# Patient Record
Sex: FEMALE | Race: WHITE | Employment: STUDENT | URBAN - METROPOLITAN AREA
[De-identification: names, ages, dates, MRNs, and addresses within clinical notes are randomized per-mention and may not be internally consistent; named-entity substitution may affect disease eponyms.]

---

## 2023-02-19 ENCOUNTER — HOSPITAL ENCOUNTER (INPATIENT)
Age: 23
LOS: 5 days | Discharge: HOME | DRG: 263 | End: 2023-02-24
Attending: SURGERY | Admitting: SURGERY
Payer: COMMERCIAL

## 2023-02-19 ENCOUNTER — APPOINTMENT (OUTPATIENT)
Dept: CT IMAGING | Age: 23
DRG: 263 | End: 2023-02-19
Payer: COMMERCIAL

## 2023-02-19 DIAGNOSIS — K80.50 CHOLEDOCHOLITHIASIS: Primary | ICD-10-CM

## 2023-02-19 DIAGNOSIS — R74.01 TRANSAMINITIS: ICD-10-CM

## 2023-02-19 DIAGNOSIS — T83.9XXA COMPLICATION OF INTRAUTERINE DEVICE (IUD), UNSPECIFIED COMPLICATION, INITIAL ENCOUNTER (HCC): ICD-10-CM

## 2023-02-19 DIAGNOSIS — K81.0 ACUTE CHOLECYSTITIS: ICD-10-CM

## 2023-02-19 LAB
A/G RATIO: 1.6 (ref 1.1–2.2)
ALBUMIN SERPL-MCNC: 4.4 G/DL (ref 3.4–5)
ALP BLD-CCNC: 140 U/L (ref 40–129)
ALT SERPL-CCNC: 335 U/L (ref 10–40)
ANION GAP SERPL CALCULATED.3IONS-SCNC: 12 MMOL/L (ref 3–16)
AST SERPL-CCNC: 178 U/L (ref 15–37)
BACTERIA: ABNORMAL /HPF
BASOPHILS ABSOLUTE: 0.1 K/UL (ref 0–0.2)
BASOPHILS RELATIVE PERCENT: 0.7 %
BILIRUB SERPL-MCNC: 3 MG/DL (ref 0–1)
BILIRUBIN URINE: ABNORMAL
BLOOD, URINE: ABNORMAL
BUN BLDV-MCNC: 8 MG/DL (ref 7–20)
CALCIUM SERPL-MCNC: 9.4 MG/DL (ref 8.3–10.6)
CHLORIDE BLD-SCNC: 99 MMOL/L (ref 99–110)
CLARITY: ABNORMAL
CO2: 22 MMOL/L (ref 21–32)
COLOR: ABNORMAL
CREAT SERPL-MCNC: <0.5 MG/DL (ref 0.6–1.1)
EOSINOPHILS ABSOLUTE: 0.1 K/UL (ref 0–0.6)
EOSINOPHILS RELATIVE PERCENT: 1.1 %
EPITHELIAL CELLS, UA: 14 /HPF (ref 0–5)
GFR SERPL CREATININE-BSD FRML MDRD: >60 ML/MIN/{1.73_M2}
GLUCOSE BLD-MCNC: 107 MG/DL (ref 70–99)
GLUCOSE URINE: NEGATIVE MG/DL
HCG QUALITATIVE: NEGATIVE
HCT VFR BLD CALC: 38.2 % (ref 36–48)
HEMOGLOBIN: 13.3 G/DL (ref 12–16)
HYALINE CASTS: 1 /LPF (ref 0–8)
KETONES, URINE: ABNORMAL MG/DL
LEUKOCYTE ESTERASE, URINE: ABNORMAL
LIPASE: 27 U/L (ref 13–60)
LYMPHOCYTES ABSOLUTE: 1.7 K/UL (ref 1–5.1)
LYMPHOCYTES RELATIVE PERCENT: 18.3 %
MCH RBC QN AUTO: 29.6 PG (ref 26–34)
MCHC RBC AUTO-ENTMCNC: 35 G/DL (ref 31–36)
MCV RBC AUTO: 84.6 FL (ref 80–100)
MICROSCOPIC EXAMINATION: YES
MONOCYTES ABSOLUTE: 0.6 K/UL (ref 0–1.3)
MONOCYTES RELATIVE PERCENT: 6.7 %
NEUTROPHILS ABSOLUTE: 6.8 K/UL (ref 1.7–7.7)
NEUTROPHILS RELATIVE PERCENT: 73.2 %
NITRITE, URINE: POSITIVE
PDW BLD-RTO: 13.8 % (ref 12.4–15.4)
PH UA: 7 (ref 5–8)
PLATELET # BLD: 317 K/UL (ref 135–450)
PMV BLD AUTO: 8.7 FL (ref 5–10.5)
POTASSIUM REFLEX MAGNESIUM: 3.9 MMOL/L (ref 3.5–5.1)
PROTEIN UA: ABNORMAL MG/DL
RBC # BLD: 4.51 M/UL (ref 4–5.2)
RBC UA: 5 /HPF (ref 0–4)
SODIUM BLD-SCNC: 133 MMOL/L (ref 136–145)
SPECIFIC GRAVITY UA: 1.02 (ref 1–1.03)
TOTAL PROTEIN: 7.2 G/DL (ref 6.4–8.2)
URINE REFLEX TO CULTURE: YES
URINE TYPE: ABNORMAL
UROBILINOGEN, URINE: 1 E.U./DL
WBC # BLD: 9.2 K/UL (ref 4–11)
WBC UA: 10 /HPF (ref 0–5)

## 2023-02-19 PROCEDURE — 2580000003 HC RX 258: Performed by: SURGERY

## 2023-02-19 PROCEDURE — 6370000000 HC RX 637 (ALT 250 FOR IP): Performed by: PHYSICIAN ASSISTANT

## 2023-02-19 PROCEDURE — 99285 EMERGENCY DEPT VISIT HI MDM: CPT

## 2023-02-19 PROCEDURE — 2500000003 HC RX 250 WO HCPCS: Performed by: SURGERY

## 2023-02-19 PROCEDURE — 1200000000 HC SEMI PRIVATE

## 2023-02-19 PROCEDURE — 81001 URINALYSIS AUTO W/SCOPE: CPT

## 2023-02-19 PROCEDURE — 87186 SC STD MICRODIL/AGAR DIL: CPT

## 2023-02-19 PROCEDURE — 87077 CULTURE AEROBIC IDENTIFY: CPT

## 2023-02-19 PROCEDURE — 80053 COMPREHEN METABOLIC PANEL: CPT

## 2023-02-19 PROCEDURE — 6360000002 HC RX W HCPCS: Performed by: PHYSICIAN ASSISTANT

## 2023-02-19 PROCEDURE — 36415 COLL VENOUS BLD VENIPUNCTURE: CPT

## 2023-02-19 PROCEDURE — 6360000004 HC RX CONTRAST MEDICATION: Performed by: PHYSICIAN ASSISTANT

## 2023-02-19 PROCEDURE — 6360000002 HC RX W HCPCS: Performed by: SURGERY

## 2023-02-19 PROCEDURE — 99222 1ST HOSP IP/OBS MODERATE 55: CPT | Performed by: SURGERY

## 2023-02-19 PROCEDURE — 84703 CHORIONIC GONADOTROPIN ASSAY: CPT

## 2023-02-19 PROCEDURE — 74177 CT ABD & PELVIS W/CONTRAST: CPT

## 2023-02-19 PROCEDURE — C9113 INJ PANTOPRAZOLE SODIUM, VIA: HCPCS | Performed by: SURGERY

## 2023-02-19 PROCEDURE — 2580000003 HC RX 258: Performed by: PHYSICIAN ASSISTANT

## 2023-02-19 PROCEDURE — 83690 ASSAY OF LIPASE: CPT

## 2023-02-19 PROCEDURE — 6360000002 HC RX W HCPCS

## 2023-02-19 PROCEDURE — 87086 URINE CULTURE/COLONY COUNT: CPT

## 2023-02-19 PROCEDURE — 85025 COMPLETE CBC W/AUTO DIFF WBC: CPT

## 2023-02-19 RX ORDER — SODIUM CHLORIDE 0.9 % (FLUSH) 0.9 %
5-40 SYRINGE (ML) INJECTION EVERY 12 HOURS SCHEDULED
Status: DISCONTINUED | OUTPATIENT
Start: 2023-02-19 | End: 2023-02-24 | Stop reason: HOSPADM

## 2023-02-19 RX ORDER — METRONIDAZOLE 500 MG/100ML
500 INJECTION, SOLUTION INTRAVENOUS EVERY 8 HOURS
Status: DISCONTINUED | OUTPATIENT
Start: 2023-02-19 | End: 2023-02-24 | Stop reason: HOSPADM

## 2023-02-19 RX ORDER — POTASSIUM CHLORIDE 7.45 MG/ML
10 INJECTION INTRAVENOUS PRN
Status: DISCONTINUED | OUTPATIENT
Start: 2023-02-19 | End: 2023-02-24 | Stop reason: HOSPADM

## 2023-02-19 RX ORDER — ONDANSETRON 4 MG/1
4 TABLET, ORALLY DISINTEGRATING ORAL ONCE
Status: COMPLETED | OUTPATIENT
Start: 2023-02-19 | End: 2023-02-19

## 2023-02-19 RX ORDER — ENOXAPARIN SODIUM 100 MG/ML
40 INJECTION SUBCUTANEOUS DAILY
Status: DISCONTINUED | OUTPATIENT
Start: 2023-02-20 | End: 2023-02-24 | Stop reason: HOSPADM

## 2023-02-19 RX ORDER — OXYCODONE HYDROCHLORIDE 5 MG/1
5 TABLET ORAL EVERY 4 HOURS PRN
Status: DISCONTINUED | OUTPATIENT
Start: 2023-02-19 | End: 2023-02-24 | Stop reason: HOSPADM

## 2023-02-19 RX ORDER — OXYCODONE HYDROCHLORIDE 10 MG/1
10 TABLET ORAL EVERY 4 HOURS PRN
Status: DISCONTINUED | OUTPATIENT
Start: 2023-02-19 | End: 2023-02-24 | Stop reason: HOSPADM

## 2023-02-19 RX ORDER — KETOROLAC TROMETHAMINE 30 MG/ML
15 INJECTION, SOLUTION INTRAMUSCULAR; INTRAVENOUS ONCE
Status: COMPLETED | OUTPATIENT
Start: 2023-02-19 | End: 2023-02-19

## 2023-02-19 RX ORDER — SODIUM CHLORIDE, SODIUM LACTATE, POTASSIUM CHLORIDE, CALCIUM CHLORIDE 600; 310; 30; 20 MG/100ML; MG/100ML; MG/100ML; MG/100ML
INJECTION, SOLUTION INTRAVENOUS CONTINUOUS
Status: DISCONTINUED | OUTPATIENT
Start: 2023-02-19 | End: 2023-02-24 | Stop reason: HOSPADM

## 2023-02-19 RX ORDER — SODIUM CHLORIDE 0.9 % (FLUSH) 0.9 %
5-40 SYRINGE (ML) INJECTION PRN
Status: DISCONTINUED | OUTPATIENT
Start: 2023-02-19 | End: 2023-02-24 | Stop reason: HOSPADM

## 2023-02-19 RX ORDER — LEVOFLOXACIN 5 MG/ML
500 INJECTION, SOLUTION INTRAVENOUS EVERY 24 HOURS
Status: DISCONTINUED | OUTPATIENT
Start: 2023-02-19 | End: 2023-02-24 | Stop reason: HOSPADM

## 2023-02-19 RX ORDER — PROCHLORPERAZINE EDISYLATE 5 MG/ML
10 INJECTION INTRAMUSCULAR; INTRAVENOUS EVERY 6 HOURS PRN
Status: DISCONTINUED | OUTPATIENT
Start: 2023-02-19 | End: 2023-02-24 | Stop reason: HOSPADM

## 2023-02-19 RX ORDER — KETOROLAC TROMETHAMINE 30 MG/ML
15 INJECTION, SOLUTION INTRAMUSCULAR; INTRAVENOUS ONCE
Status: DISCONTINUED | OUTPATIENT
Start: 2023-02-19 | End: 2023-02-19

## 2023-02-19 RX ORDER — ONDANSETRON 4 MG/1
4 TABLET, ORALLY DISINTEGRATING ORAL EVERY 8 HOURS PRN
Status: DISCONTINUED | OUTPATIENT
Start: 2023-02-19 | End: 2023-02-24 | Stop reason: HOSPADM

## 2023-02-19 RX ORDER — ACETAMINOPHEN 325 MG/1
650 TABLET ORAL EVERY 4 HOURS PRN
Status: DISCONTINUED | OUTPATIENT
Start: 2023-02-19 | End: 2023-02-24 | Stop reason: HOSPADM

## 2023-02-19 RX ORDER — MAGNESIUM SULFATE IN WATER 40 MG/ML
2000 INJECTION, SOLUTION INTRAVENOUS PRN
Status: DISCONTINUED | OUTPATIENT
Start: 2023-02-19 | End: 2023-02-24 | Stop reason: HOSPADM

## 2023-02-19 RX ORDER — ONDANSETRON 2 MG/ML
4 INJECTION INTRAMUSCULAR; INTRAVENOUS EVERY 6 HOURS PRN
Status: DISCONTINUED | OUTPATIENT
Start: 2023-02-19 | End: 2023-02-24 | Stop reason: HOSPADM

## 2023-02-19 RX ORDER — SODIUM CHLORIDE 9 MG/ML
INJECTION, SOLUTION INTRAVENOUS PRN
Status: DISCONTINUED | OUTPATIENT
Start: 2023-02-19 | End: 2023-02-24 | Stop reason: HOSPADM

## 2023-02-19 RX ADMIN — Medication 25 MG: at 22:53

## 2023-02-19 RX ADMIN — ONDANSETRON 4 MG: 4 TABLET, ORALLY DISINTEGRATING ORAL at 14:54

## 2023-02-19 RX ADMIN — ONDANSETRON 4 MG: 2 INJECTION INTRAMUSCULAR; INTRAVENOUS at 21:25

## 2023-02-19 RX ADMIN — KETOROLAC TROMETHAMINE 15 MG: 30 INJECTION, SOLUTION INTRAMUSCULAR; INTRAVENOUS at 14:53

## 2023-02-19 RX ADMIN — PIPERACILLIN AND TAZOBACTAM 4500 MG: 4; .5 INJECTION, POWDER, LYOPHILIZED, FOR SOLUTION INTRAVENOUS at 16:40

## 2023-02-19 RX ADMIN — SODIUM CHLORIDE, POTASSIUM CHLORIDE, SODIUM LACTATE AND CALCIUM CHLORIDE: 600; 310; 30; 20 INJECTION, SOLUTION INTRAVENOUS at 22:22

## 2023-02-19 RX ADMIN — SODIUM CHLORIDE, PRESERVATIVE FREE 40 MG: 5 INJECTION INTRAVENOUS at 18:46

## 2023-02-19 RX ADMIN — METRONIDAZOLE 500 MG: 500 INJECTION, SOLUTION INTRAVENOUS at 18:52

## 2023-02-19 RX ADMIN — IOPAMIDOL 75 ML: 755 INJECTION, SOLUTION INTRAVENOUS at 14:46

## 2023-02-19 RX ADMIN — LEVOFLOXACIN 500 MG: 5 INJECTION, SOLUTION INTRAVENOUS at 21:29

## 2023-02-19 ASSESSMENT — PAIN SCALES - GENERAL
PAINLEVEL_OUTOF10: 2
PAINLEVEL_OUTOF10: 5
PAINLEVEL_OUTOF10: 6
PAINLEVEL_OUTOF10: 2
PAINLEVEL_OUTOF10: 0
PAINLEVEL_OUTOF10: 0

## 2023-02-19 ASSESSMENT — PAIN - FUNCTIONAL ASSESSMENT: PAIN_FUNCTIONAL_ASSESSMENT: 0-10

## 2023-02-19 ASSESSMENT — PAIN DESCRIPTION - ORIENTATION
ORIENTATION: MID
ORIENTATION: MID

## 2023-02-19 ASSESSMENT — ENCOUNTER SYMPTOMS
COLOR CHANGE: 0
ABDOMINAL PAIN: 1
BLOOD IN STOOL: 0
VOMITING: 1
SHORTNESS OF BREATH: 0
NAUSEA: 1
DIARRHEA: 0
COUGH: 0

## 2023-02-19 ASSESSMENT — PAIN DESCRIPTION - DESCRIPTORS
DESCRIPTORS: PRESSURE
DESCRIPTORS: PRESSURE

## 2023-02-19 ASSESSMENT — PAIN DESCRIPTION - LOCATION
LOCATION: ABDOMEN
LOCATION: ABDOMEN

## 2023-02-19 NOTE — PROGRESS NOTES
02/19/23 1838   Encounter Summary   Encounter Overview/Reason  Spiritual/Emotional Needs; Rituals, Rites and Sacraments   Service Provided For: Patient   Referral/Consult From: Nurse   Support System Parent   Last Encounter    (Support - request for prayer, anointing on 2/20 SM)   Complexity of Encounter Moderate   Begin Time 1820   End Time  1839   Total Time Calculated 19 min   Spiritual/Emotional needs   Type Spiritual Support   Rituals, Rites and Sacraments   Type   (request for Sacrament of the Sick, prayer)   Assessment/Intervention/Outcome   Assessment Coping   Intervention Discussed illness injury and its impact   Outcome Comfort;Expressed feelings, needs, and concerns   Plan and Referrals   Plan/Referrals Placed on [facility] List  (Referred to Fr. Ruano for visit on 2/20) Patient reportedly hard of hearing./impaired

## 2023-02-19 NOTE — H&P
General Surgery History & Physical      Georgia Snell   : 2000 MRN: [id number]  Date of Admission: 2023  Admitting Nhi Caldwell MD  Primary Care Physician: Jaden Strange MD    Chief Complaint   Patient presents with    Abdominal Pain     Worsen the past 2-3 days, c/o n/v       Chief Complaint/Reason for consultation: \"abdominal pain\"    Diagnosis Present on Admission:   Choledocholithiasis      Secondary Diagnosis  Patient Active Problem List   Diagnosis    Choledocholithiasis       History of Present Illness  Georgia Snell is a 25 y.o. female with negative PMHx who presents with a 3 day history of abdominal pain. Her pain is epigastric and RUQ, progressively worsening.  + nausea, vomiting and worsening symptoms after eating. She has had dark colored urine and pale stool. No fevers or chills. Approximately 3 weeks ago she had a similar episode of pain that occurred overnight and resolved without intervention. History reviewed. No pertinent past medical history. History reviewed. No pertinent surgical history. Family history reviewed and otherwise negative. History reviewed. No pertinent family history.   Social History     Socioeconomic History    Marital status: Single     Spouse name: Not on file    Number of children: Not on file    Years of education: Not on file    Highest education level: Not on file   Occupational History    Not on file   Tobacco Use    Smoking status: Not on file    Smokeless tobacco: Not on file   Substance and Sexual Activity    Alcohol use: Not on file    Drug use: Not on file    Sexual activity: Not on file   Other Topics Concern    Not on file   Social History Narrative    Not on file     Social Determinants of Health     Financial Resource Strain: Not on file   Food Insecurity: Not on file   Transportation Needs: Not on file   Physical Activity: Not on file   Stress: Not on file   Social Connections: Not on file   Intimate Partner Violence: Not on file   Housing Stability: Not on file     No Known Allergies  Prior to Admission medications    Not on File       Review of Systems  Pertinent positives and negatives are in HPI, otherwise all systems reviewed and negative    Physical Exam  Vitals:    02/19/23 1742   BP: 123/73   Pulse: 54   Resp: 16   Temp: 98 °F (36.7 °C)   SpO2: 99%       General/Appearance: NAD, alert and oriented  HEENT: PERRLA, Conjunctiva non injected, no scleral icterus. Mucous membranes pink and moist.  Neck is symmetrical. Trachea appears midline. Lung: normal respiratory effort, no accessory effort  Cardiac: Regular rate and rhythm  Abdomen: soft, ND, moderate epigastric and RUQ tenderness, no peritonitis  Neuro: No gross motor or sensory deficits. Skin: No open wounds or rashes. Labs  Recent Labs     02/19/23  1353   WBC 9.2   HGB 13.3   HCT 38.2        Recent Labs     02/19/23  1353   *   K 3.9   CL 99   CO2 22   BUN 8     Recent Labs     02/19/23  1353   *   *     No results for input(s): UOSM in the last 72 hours. Invalid input(s): [facility]APR, [facility], [facility], [place], [facility], [facility], [facility]    Imaging  CT ABDOMEN PELVIS W IV CONTRAST Additional Contrast? None   Preliminary Result   Intrahepatic and extrahepatic biliary ductal dilatation with underlying   cholelithiasis. Correlation with liver function test is recommended, which   will dictate the need for additional imaging such as MRI/MRCP or endoscopic   examination. T-shaped contraceptive device appears to be abnormally located and the left   arm appears to be overlying the superior wall of the uterine   myometrium/serosa, which may reflect partial perforation. Recommend   gynecological consultation. Apparent diffuse urinary bladder wall thickening, which may be due to under   distention. Correlate clinically with signs of cystitis.           CT abd/pelvis personally reviewed, showing distended gallbladder and biliary system    Assessment  Apolinar Wolf is a 25 y.o. female who presents with suspected choledocholithiasis, hyperbilirubinemia    Plan    1. Hyperbilirubinemia with suspected choledocholithiasis  Cholelithiasis and dilated bile duct system on CT imaging  T bili 3 and elevated LFTs  Levaquin, flagyl  OK for clears, NPO after midnight  Will consult GI in the morning for ERCP. Will plan to perform cholecystectomy the day afterwards.   2. TRISTA Lara MD

## 2023-02-19 NOTE — ED PROVIDER NOTES
[address]        Pt Name: Zac Roche  MRN: [id number]  [place] 2000  Date of evaluation: 2/19/2023  Provider: WILMER Swenson  PCP: Citlalli Rodriguez MD  Note Started: 6:05 PM EST 2/19/23       I have seen and evaluated this patient with my supervising physician Dr Ray Zaragoza. CHIEF COMPLAINT       Chief Complaint   Patient presents with    Abdominal Pain     Worsen the past 2-3 days, c/o n/v       HISTORY OF PRESENT ILLNESS: 1 or more Elements     History From: Patient  Limitations to history : None    Zac Roche is a 25 y.o. female who presents to the emergency department today with complaints of abdominal pain, nauseousness, vomiting. Patient states that this has been going on for a little while, but the last 2 to 3 days has become much worse. She states the pain is a burning type sensation located in the upper part of her abdomen. The symptoms are made worse whenever she eats. She states she has a very diminished appetite, because anytime she eats she just feels worse. She reports that her stools not outright diarrhea, but is a bit softer than normal and light brown in color. She also reports that her urine has been very dark in appearance in the last several days as well. She does not have any fevers, chest pain, shortness of breath. She denies previous abdominal surgeries. She denies any family history of gallbladder disease. She states she occasionally drinks alcohol, smokes marijuana every other day, but denies other drug use. She has no further complaints. Nursing Notes were all reviewed and agreed with or any disagreements were addressed in the HPI. REVIEW OF SYSTEMS :      Review of Systems   Constitutional:  Negative for chills and fever. Respiratory:  Negative for cough and shortness of breath. Cardiovascular:  Negative for chest pain and palpitations.    Gastrointestinal:  Positive for abdominal pain, nausea and vomiting. Negative for blood in stool and diarrhea.   Genitourinary:  Negative for dysuria, pelvic pain and urgency.   Skin:  Negative for color change and rash.   Psychiatric/Behavioral:  Negative for agitation and confusion.      Positives and Pertinent negatives as per HPI.     SURGICAL HISTORY   History reviewed. No pertinent surgical history.    CURRENTMEDICATIONS     There are no discharge medications for this patient.      ALLERGIES     Patient has no known allergies.    FAMILYHISTORY     History reviewed. No pertinent family history.     SOCIAL HISTORY       Social History     Tobacco Use    Smoking status: Never    Smokeless tobacco: Never   Substance Use Topics    Alcohol use: Not Currently    Drug use: Yes     Frequency: 3.0 times per week     Types: Marijuana (Weed)     Comment: 3 times per week       SCREENINGS        [place] Coma Scale  Eye Opening: Spontaneous  Best Verbal Response: Oriented  Best Motor Response: Obeys commands  [place] Coma Scale Score: 15                CIWA Assessment  BP: 115/68  Heart Rate: 50           PHYSICAL EXAM  1 or more Elements     ED Triage Vitals [02/19/23 1339]   BP Temp Temp Source Heart Rate Resp SpO2 Height Weight   121/76 98 °F (36.7 °C) Oral 62 17 99 % 5' 10\" (1.778 m) 167 lb 15.9 oz (76.2 kg)       Physical Exam  Vitals and nursing note reviewed.   Constitutional:       General: She is not in acute distress.     Appearance: She is well-developed. She is not ill-appearing, toxic-appearing or diaphoretic.   HENT:      Head: Normocephalic and atraumatic.   Eyes:      Conjunctiva/sclera: Conjunctivae normal.      Pupils: Pupils are equal, round, and reactive to light.   Cardiovascular:      Rate and Rhythm: Normal rate and regular rhythm.   Pulmonary:      Effort: Pulmonary effort is normal. No respiratory distress.   Abdominal:      General: Abdomen is flat. Bowel sounds are normal.      Palpations: Abdomen is soft.      Tenderness: There is abdominal tenderness in  the right upper quadrant and epigastric area. There is no guarding or rebound. Skin:     General: Skin is warm and dry. Neurological:      Mental Status: She is alert. Psychiatric:         Behavior: Behavior normal. Behavior is cooperative. DIAGNOSTIC RESULTS   LABS:    Labs Reviewed   COMPREHENSIVE METABOLIC PANEL W/ REFLEX TO MG FOR LOW K - Abnormal; Notable for the following components:       Result Value    Sodium 133 (*)     Glucose 107 (*)     Creatinine <0.5 (*)     Total Bilirubin 3.0 (*)     Alkaline Phosphatase 140 (*)      (*)      (*)     All other components within normal limits   URINALYSIS WITH REFLEX TO CULTURE - Abnormal; Notable for the following components:    Color, UA DARK YELLOW (*)     Clarity, UA CLOUDY (*)     Bilirubin Urine LARGE (*)     Ketones, Urine TRACE (*)     Blood, Urine TRACE (*)     Protein, UA TRACE (*)     Nitrite, Urine POSITIVE (*)     Leukocyte Esterase, Urine SMALL (*)     All other components within normal limits   MICROSCOPIC URINALYSIS - Abnormal; Notable for the following components:    Bacteria, UA 4+ (*)     WBC, UA 10 (*)     RBC, UA 5 (*)     Epithelial Cells, UA 14 (*)     All other components within normal limits   CULTURE, URINE   HCG, SERUM, QUALITATIVE   CBC WITH AUTO DIFFERENTIAL   LIPASE   BASIC METABOLIC PANEL W/ REFLEX TO MG FOR LOW K   MAGNESIUM   PHOSPHORUS   CBC WITH AUTO DIFFERENTIAL       When ordered only abnormal lab results are displayed. All other labs were within normal range or not returned as of this dictation. EKG: When ordered, EKG's are interpreted by the Emergency Department Physician in the absence of a cardiologist.  Please see their note for interpretation of EKG.     RADIOLOGY:   Non-plain film images such as CT, Ultrasound and MRI are read by the radiologist. Plain radiographic images are visualized and preliminarily interpreted by the ED Provider with the below findings:    Interpretation per the Radiologist below, if available at the time of this note:    CT ABDOMEN PELVIS W IV CONTRAST Additional Contrast? None   Preliminary Result   Intrahepatic and extrahepatic biliary ductal dilatation with underlying   cholelithiasis. Correlation with liver function test is recommended, which   will dictate the need for additional imaging such as MRI/MRCP or endoscopic   examination. T-shaped contraceptive device appears to be abnormally located and the left   arm appears to be overlying the superior wall of the uterine   myometrium/serosa, which may reflect partial perforation. Recommend   gynecological consultation. Apparent diffuse urinary bladder wall thickening, which may be due to under   distention. Correlate clinically with signs of cystitis. CT ABDOMEN PELVIS W IV CONTRAST Additional Contrast? None    Result Date: 2/19/2023  EXAMINATION: CT OF THE ABDOMEN AND PELVIS WITH CONTRAST 2/19/2023 2:45 pm TECHNIQUE: CT of the abdomen and pelvis was performed with the administration of intravenous contrast. Multiplanar reformatted images are provided for review. Automated exposure control, iterative reconstruction, and/or weight based adjustment of the mA/kV was utilized to reduce the radiation dose to as low as reasonably achievable. COMPARISON: None. HISTORY: ORDERING SYSTEM PROVIDED HISTORY: Upper epigastric and RUQ abdominal pain, N/V TECHNOLOGIST PROVIDED HISTORY: Reason for exam:->Upper epigastric and RUQ abdominal pain, N/V Additional Contrast?->None Decision Support Exception - unselect if not a suspected or confirmed emergency medical condition->Emergency Medical Condition (MA) FINDINGS: Lower Chest: No confluent airspace opacity or pleural effusion seen at the lung bases. Organs: There is prominence of intrahepatic and extrahepatic bile ducts. The extrahepatic common bile duct measures up to 11 mm.   There are punctate foci of gas and calcific density seen within fundus of the gallbladder reflecting underlying gallstones.  No evidence of pancreatic ductal dilatation seen. The spleen is not enlarged.  The adrenal glands appear within normal limits. The kidneys appear to enhance symmetrically.  No evidence of nephrolithiasis or hydronephrosis seen.  The ureters are not well visualized due to relative lack of intraabdominal intrapelvic fat. GI/Bowel: Evaluation of the GI tract is limited due to under distension.  The appendix appears nondilated.  No evidence of bowel obstruction seen. Pelvis: The urinary bladder is under distended.  Apparent diffuse urinary bladder wall thickening may be due to under distension.  There is a T-shaped contraceptive device.  The left arm of the device appears to overlie the superior wall of the uterine myometrium/serosa.  This may reflect partial perforation. Peritoneum/Retroperitoneum: No evidence of intraabdominal free air is seen. No evidence of ascites is seen.  The abdominal aorta is normal in caliber. Bones/Soft Tissues: No evidence of acute osseous abnormality seen.     Intrahepatic and extrahepatic biliary ductal dilatation with underlying cholelithiasis.  Correlation with liver function test is recommended, which will dictate the need for additional imaging such as MRI/MRCP or endoscopic examination. T-shaped contraceptive device appears to be abnormally located and the left arm appears to be overlying the superior wall of the uterine myometrium/serosa, which may reflect partial perforation.  Recommend gynecological consultation. Apparent diffuse urinary bladder wall thickening, which may be due to under distention.  Correlate clinically with signs of cystitis.       No results found.    PROCEDURES   Unless otherwise noted below, none     Procedures    PAST MEDICAL HISTORY      has no past medical history on file.     EMERGENCY DEPARTMENT COURSE and DIFFERENTIAL DIAGNOSIS/MDM:   Vitals:    Vitals:    02/19/23 1615 02/19/23 1700 02/19/23 1742 02/19/23  1851   BP: 115/71 116/81 123/73 115/68   Pulse: 64 64 54 50   Resp: 18 16 16 17   Temp:   98 °F (36.7 °C) 98.5 °F (36.9 °C)   TempSrc:   Oral Oral   SpO2: 100% 100% 99% 99%   Weight:       Height:           Patient was given the following medications:  Medications   sodium chloride flush 0.9 % injection 5-40 mL (has no administration in time range)   sodium chloride flush 0.9 % injection 5-40 mL (has no administration in time range)   0.9 % sodium chloride infusion (has no administration in time range)   ondansetron (ZOFRAN-ODT) disintegrating tablet 4 mg (has no administration in time range)     Or   ondansetron (ZOFRAN) injection 4 mg (has no administration in time range)   enoxaparin (LOVENOX) injection 40 mg (has no administration in time range)   lactated ringers IV soln infusion (has no administration in time range)   potassium chloride 10 mEq/100 mL IVPB (Peripheral Line) (has no administration in time range)   magnesium sulfate 2000 mg in 50 mL IVPB premix (has no administration in time range)   metronidazole (FLAGYL) 500 mg in 0.9% NaCl 100 mL IVPB premix (500 mg IntraVENous New Bag 2/19/23 1852)   levoFLOXacin (LEVAQUIN) 500 MG/100ML infusion 500 mg (has no administration in time range)   acetaminophen (TYLENOL) tablet 650 mg (has no administration in time range)   oxyCODONE (ROXICODONE) immediate release tablet 5 mg (has no administration in time range)     Or   oxyCODONE HCl (OXY-IR) immediate release tablet 10 mg (has no administration in time range)   HYDROmorphone (DILAUDID) injection 0.5 mg (has no administration in time range)     Or   HYDROmorphone (DILAUDID) injection 1 mg (has no administration in time range)   pantoprazole (PROTONIX) 40 mg in sodium chloride (PF) 0.9 % 10 mL injection (40 mg IntraVENous Given 2/19/23 1846)   ondansetron (ZOFRAN-ODT) disintegrating tablet 4 mg (4 mg Oral Given 2/19/23 1454)   iopamidol (ISOVUE-370) 76 % injection 75 mL (75 mLs IntraVENous Given 2/19/23 0288) ketorolac (TORADOL) injection 15 mg (15 mg IntraVENous Given 2/19/23 4697)   piperacillin-tazobactam (ZOSYN) 4,500 mg in sodium chloride 0.9 % 100 mL IVPB (mini-bag) (0 mg IntraVENous Stopped 2/19/23 9976)             Is this patient to be included in the SEP-1 Core Measure due to severe sepsis or septic shock? No   Exclusion criteria - the patient is NOT to be included for SEP-1 Core Measure due to: Infection is not suspected    Chronic Conditions affecting care:    has no past medical history on file. CONSULTS: (Who and What was discussed)  IP CONSULT TO GENERAL SURGERY  IP CONSULT TO SPIRITUAL SERVICES      Social Determinants Significantly Affecting Health : None    Records Reviewed (External and Source) EMR    CC/HPI Summary, DDx, ED Course, and Reassessment: This is a 25year old female with complaints of abdominal pain, nauseousness, vomiting. Patient states that this has been going on for a little while, but the last 2 to 3 days has become much worse. She states the pain is a burning type sensation located in the upper part of her abdomen. The symptoms are made worse whenever she eats. She states she has a very diminished appetite, because anytime she eats she just feels worse. She reports that her stools not outright diarrhea, but is a bit softer than normal and light brown in color. She also reports that her urine has been very dark in appearance in the last several days as well. She does not have any fevers, chest pain, shortness of breath. She denies previous abdominal surgeries. She denies any family history of gallbladder disease. She states she occasionally drinks alcohol, smokes marijuana every other day, but denies other drug use. - Vital signs were reviewed. Exam as above. Peripheral IV placed. Labs, Imaging ordered. - Pertinent Labs & Imaging studies reviewed. (See chart for details)   -  Patient seen and evaluated in the emergency department.   -  Triage and nursing notes reviewed and incorporated. -  Old chart records reviewed and incorporated. -  Code status: FULL  -   I have seen and evaluated this patient with my supervising physician Dr Andry Stack. -  Differential diagnosis includes: kidney stone, pyelonephritis, UTI, appendicitis, bowel obstruction, diverticulitis, hernia, gastritis/gastroenteritis, pancreatitis, cholecystitis, hepatitis, constipation, IBS, IBD  -  Work-up included:  See above  -  ED treatment included: Zofran 4 mg ODT, Toradol 15 mg IV, Zosyn 4500 mg IV  - Consults: General Surgery - I spoke with Dr Silvestre Kirk who reviewed the CT images, discussed the case with me, recommended admission to his service. He was agreeable to the Zosyn, and will discuss getting GI on board tomorrow as an inpatient.  -  Results discussed with patient and/or family. Labs show no leukocytosis or concerning anemia. Metabolic panel with bilirubin of 3.0, alk phos 140, , . Lipase is not elevated at 27. She is not pregnant. Urine with leukocytes, nitrites, 4+ bacteria, 10 white blood cells, 14 epithelial cells. It is possible this is contaminated, she does not have any UTI symptoms at this time. Imaging studies show intrahepatic and extrahepatic biliary ductal dilatation with underlying cholelithiasis, and given her correlated epigastric tenderness, elevated liver enzymes, patient likely will need endoscopic evaluation or  possible MRI MRCP. Also incidentally noted is that her IUD appears to be abnormally located, and may be partially perforated in the uterine wall with recommendations for gynecological consultation. She does not have any pain in her lower abdomen at this time, the IUD was apparently placed in October. Will advise her of this abnormal finding and recommend that she follow-up with her gynecologist.  Patient feels much improved after the Toradol, Zofran.   At this time, we recommend admission, as the patient would benefit from admission for further evaluation and management. The patient and/or family is agreeable with plan of care and disposition.  -  Disposition: Admission  - Critical Care: The total critical care time I independently spent while evaluating and treating this patient was 12 minutes. This excludes time spent doing separately billable procedures. This includes time at the bedside, data interpretation, medication management, obtaining critical history from collateral sources if the patient is unable to provide it directly, and physician consultation. Specifics of interventions taken and potentially life-threatening diagnostic considerations are listed above in the medical decision making. If this was a shared visit with an physician, the time in this attestation is non-concurrent critical care time out of the total shared critical care time provided by the physician and myself. FINAL IMPRESSION      1. Choledocholithiasis    2. Transaminitis    3. Complication of intrauterine device (IUD), unspecified complication, initial encounter [facility])          DISPOSITION/PLAN     DISPOSITION Admitted 02/19/2023 04:44:29 PM      PATIENT REFERRED TO:  No follow-up provider specified. DISCHARGE MEDICATIONS:  There are no discharge medications for this patient. DISCONTINUED MEDICATIONS:  There are no discharge medications for this patient.              (Please note that portions of this note were completed with a voice recognition program.  Efforts were made to edit the dictations but occasionally words are mis-transcribed.)    WILMER Govea (electronically signed)       Lucius Govea  02/19/23 2012

## 2023-02-19 NOTE — ED PROVIDER NOTES
Attending Supervisory Note/Shared Visit   I have personally performed a face to face diagnostic evaluation on this patient. I have reviewed the mid-levels findings and agree. History and Exam by me shows female no acute distress. 3-day history of increasing right upper quadrant abdominal pain, nausea, and vomiting. Radiates to her right back/shoulder area. No fever. General: Alert white female in no acute distress. She appears uncomfortable. Heart: Regular rate and rhythm. No murmurs or gallops noted. Lungs: Breath sounds equal bilaterally and clear. Abdomen: Soft, nondistended, right upper quadrant right subcostal tenderness with guarding but no rebound. No masses organomegaly. Bowel sounds are normal.    Labs Reviewed   COMPREHENSIVE METABOLIC PANEL W/ REFLEX TO MG FOR LOW K - Abnormal; Notable for the following components:       Result Value    Sodium 133 (*)     Glucose 107 (*)     Creatinine <0.5 (*)     Total Bilirubin 3.0 (*)     Alkaline Phosphatase 140 (*)      (*)      (*)     All other components within normal limits   URINALYSIS WITH REFLEX TO CULTURE - Abnormal; Notable for the following components:    Color, UA DARK YELLOW (*)     Clarity, UA CLOUDY (*)     Bilirubin Urine LARGE (*)     Ketones, Urine TRACE (*)     Blood, Urine TRACE (*)     Protein, UA TRACE (*)     Nitrite, Urine POSITIVE (*)     Leukocyte Esterase, Urine SMALL (*)     All other components within normal limits   MICROSCOPIC URINALYSIS - Abnormal; Notable for the following components:    Bacteria, UA 4+ (*)     WBC, UA 10 (*)     RBC, UA 5 (*)     Epithelial Cells, UA 14 (*)     All other components within normal limits   CULTURE, URINE   HCG, SERUM, QUALITATIVE   CBC WITH AUTO DIFFERENTIAL   LIPASE     CT ABDOMEN PELVIS W IV CONTRAST Additional Contrast? None   Preliminary Result   Intrahepatic and extrahepatic biliary ductal dilatation with underlying   cholelithiasis.   Correlation with liver function test is recommended, which   will dictate the need for additional imaging such as MRI/MRCP or endoscopic   examination. T-shaped contraceptive device appears to be abnormally located and the left   arm appears to be overlying the superior wall of the uterine   myometrium/serosa, which may reflect partial perforation. Recommend   gynecological consultation. Apparent diffuse urinary bladder wall thickening, which may be due to under   distention. Correlate clinically with signs of cystitis. General surgery was consulted. Dr. Merline Doyne will admit. IV antibiotics were initiated. Patient was medicated for pain and nausea. Test results, diagnosis, and treatment plan were discussed with the patient. She understands treatment plan and need for admission is agreeable      1. Choledocholithiasis    2. Transaminitis      Disposition:  Admit    Is this patient to be included in the SEP-1 Core Measure due to severe sepsis or septic shock?    No   Exclusion criteria - the patient is NOT to be included for SEP-1 Core Measure due to:  2+ SIRS criteria are not met    (Please note that portions of this note were completed with a voice recognition program.  Efforts were made to edit the dictations but occasionally words are mis-transcribed.)    Erika Landin MD  Attending Emergency Physician        Crissy Wiley MD  02/19/23 2738

## 2023-02-20 ENCOUNTER — APPOINTMENT (OUTPATIENT)
Dept: GENERAL RADIOLOGY | Age: 23
DRG: 263 | End: 2023-02-20
Payer: COMMERCIAL

## 2023-02-20 ENCOUNTER — ANESTHESIA EVENT (OUTPATIENT)
Dept: ENDOSCOPY | Age: 23
End: 2023-02-20
Payer: COMMERCIAL

## 2023-02-20 ENCOUNTER — ANESTHESIA EVENT (OUTPATIENT)
Dept: OPERATING ROOM | Age: 23
End: 2023-02-20
Payer: COMMERCIAL

## 2023-02-20 ENCOUNTER — ANESTHESIA (OUTPATIENT)
Dept: OPERATING ROOM | Age: 23
End: 2023-02-20
Payer: COMMERCIAL

## 2023-02-20 LAB
ALBUMIN SERPL-MCNC: 3.9 G/DL (ref 3.4–5)
ALP BLD-CCNC: 126 U/L (ref 40–129)
ALT SERPL-CCNC: 261 U/L (ref 10–40)
ANION GAP SERPL CALCULATED.3IONS-SCNC: 10 MMOL/L (ref 3–16)
AST SERPL-CCNC: 111 U/L (ref 15–37)
BASOPHILS ABSOLUTE: 0 K/UL (ref 0–0.2)
BASOPHILS RELATIVE PERCENT: 0.6 %
BILIRUB SERPL-MCNC: 3.6 MG/DL (ref 0–1)
BILIRUBIN DIRECT: 2.5 MG/DL (ref 0–0.3)
BILIRUBIN, INDIRECT: 1.1 MG/DL (ref 0–1)
BUN BLDV-MCNC: 6 MG/DL (ref 7–20)
CALCIUM SERPL-MCNC: 9.4 MG/DL (ref 8.3–10.6)
CHLORIDE BLD-SCNC: 105 MMOL/L (ref 99–110)
CO2: 25 MMOL/L (ref 21–32)
CREAT SERPL-MCNC: 0.7 MG/DL (ref 0.6–1.1)
EOSINOPHILS ABSOLUTE: 0.1 K/UL (ref 0–0.6)
EOSINOPHILS RELATIVE PERCENT: 1.3 %
GFR SERPL CREATININE-BSD FRML MDRD: >60 ML/MIN/{1.73_M2}
GLUCOSE BLD-MCNC: 102 MG/DL (ref 70–99)
HCT VFR BLD CALC: 36.3 % (ref 36–48)
HEMOGLOBIN: 12.8 G/DL (ref 12–16)
LYMPHOCYTES ABSOLUTE: 2 K/UL (ref 1–5.1)
LYMPHOCYTES RELATIVE PERCENT: 23.5 %
MCH RBC QN AUTO: 29.5 PG (ref 26–34)
MCHC RBC AUTO-ENTMCNC: 35.2 G/DL (ref 31–36)
MCV RBC AUTO: 83.9 FL (ref 80–100)
MONOCYTES ABSOLUTE: 0.6 K/UL (ref 0–1.3)
MONOCYTES RELATIVE PERCENT: 7.7 %
NEUTROPHILS ABSOLUTE: 5.6 K/UL (ref 1.7–7.7)
NEUTROPHILS RELATIVE PERCENT: 66.9 %
PDW BLD-RTO: 14.1 % (ref 12.4–15.4)
PLATELET # BLD: 294 K/UL (ref 135–450)
PMV BLD AUTO: 8.9 FL (ref 5–10.5)
POTASSIUM REFLEX MAGNESIUM: 4.2 MMOL/L (ref 3.5–5.1)
RBC # BLD: 4.32 M/UL (ref 4–5.2)
SODIUM BLD-SCNC: 140 MMOL/L (ref 136–145)
TOTAL PROTEIN: 6.7 G/DL (ref 6.4–8.2)
WBC # BLD: 8.3 K/UL (ref 4–11)

## 2023-02-20 PROCEDURE — 47563 LAPARO CHOLECYSTECTOMY/GRAPH: CPT | Performed by: SURGERY

## 2023-02-20 PROCEDURE — C1757 CATH, THROMBECTOMY/EMBOLECT: HCPCS | Performed by: SURGERY

## 2023-02-20 PROCEDURE — 6360000002 HC RX W HCPCS: Performed by: SURGERY

## 2023-02-20 PROCEDURE — 6370000000 HC RX 637 (ALT 250 FOR IP): Performed by: SURGERY

## 2023-02-20 PROCEDURE — 7100000000 HC PACU RECOVERY - FIRST 15 MIN: Performed by: SURGERY

## 2023-02-20 PROCEDURE — 2580000003 HC RX 258: Performed by: SURGERY

## 2023-02-20 PROCEDURE — 85025 COMPLETE CBC W/AUTO DIFF WBC: CPT

## 2023-02-20 PROCEDURE — 3600000004 HC SURGERY LEVEL 4 BASE: Performed by: SURGERY

## 2023-02-20 PROCEDURE — 1200000000 HC SEMI PRIVATE

## 2023-02-20 PROCEDURE — 6360000002 HC RX W HCPCS

## 2023-02-20 PROCEDURE — 36415 COLL VENOUS BLD VENIPUNCTURE: CPT

## 2023-02-20 PROCEDURE — 2709999900 HC NON-CHARGEABLE SUPPLY: Performed by: SURGERY

## 2023-02-20 PROCEDURE — 2500000003 HC RX 250 WO HCPCS: Performed by: SURGERY

## 2023-02-20 PROCEDURE — 80076 HEPATIC FUNCTION PANEL: CPT

## 2023-02-20 PROCEDURE — 7100000001 HC PACU RECOVERY - ADDTL 15 MIN: Performed by: SURGERY

## 2023-02-20 PROCEDURE — 80048 BASIC METABOLIC PNL TOTAL CA: CPT

## 2023-02-20 PROCEDURE — 0FT44ZZ RESECTION OF GALLBLADDER, PERCUTANEOUS ENDOSCOPIC APPROACH: ICD-10-PCS | Performed by: SURGERY

## 2023-02-20 PROCEDURE — 2500000003 HC RX 250 WO HCPCS

## 2023-02-20 PROCEDURE — 94760 N-INVAS EAR/PLS OXIMETRY 1: CPT

## 2023-02-20 PROCEDURE — BF131ZZ FLUOROSCOPY OF GALLBLADDER AND BILE DUCTS USING LOW OSMOLAR CONTRAST: ICD-10-PCS | Performed by: SURGERY

## 2023-02-20 PROCEDURE — 6360000002 HC RX W HCPCS: Performed by: ANESTHESIOLOGY

## 2023-02-20 PROCEDURE — 2580000003 HC RX 258: Performed by: ANESTHESIOLOGY

## 2023-02-20 PROCEDURE — 6360000004 HC RX CONTRAST MEDICATION: Performed by: SURGERY

## 2023-02-20 PROCEDURE — 74300 X-RAY BILE DUCTS/PANCREAS: CPT

## 2023-02-20 PROCEDURE — 88304 TISSUE EXAM BY PATHOLOGIST: CPT

## 2023-02-20 PROCEDURE — 3700000000 HC ANESTHESIA ATTENDED CARE: Performed by: SURGERY

## 2023-02-20 PROCEDURE — A4217 STERILE WATER/SALINE, 500 ML: HCPCS | Performed by: SURGERY

## 2023-02-20 PROCEDURE — C9113 INJ PANTOPRAZOLE SODIUM, VIA: HCPCS | Performed by: SURGERY

## 2023-02-20 RX ORDER — SODIUM CHLORIDE 0.9 % (FLUSH) 0.9 %
5-40 SYRINGE (ML) INJECTION EVERY 12 HOURS SCHEDULED
Status: DISCONTINUED | OUTPATIENT
Start: 2023-02-20 | End: 2023-02-20 | Stop reason: HOSPADM

## 2023-02-20 RX ORDER — MIDAZOLAM HYDROCHLORIDE 1 MG/ML
INJECTION INTRAMUSCULAR; INTRAVENOUS PRN
Status: DISCONTINUED | OUTPATIENT
Start: 2023-02-20 | End: 2023-02-20 | Stop reason: SDUPTHER

## 2023-02-20 RX ORDER — SUCCINYLCHOLINE/SOD CL,ISO/PF 200MG/10ML
SYRINGE (ML) INTRAVENOUS PRN
Status: DISCONTINUED | OUTPATIENT
Start: 2023-02-20 | End: 2023-02-20 | Stop reason: SDUPTHER

## 2023-02-20 RX ORDER — LIDOCAINE HYDROCHLORIDE 20 MG/ML
INJECTION, SOLUTION EPIDURAL; INFILTRATION; INTRACAUDAL; PERINEURAL PRN
Status: DISCONTINUED | OUTPATIENT
Start: 2023-02-20 | End: 2023-02-20 | Stop reason: SDUPTHER

## 2023-02-20 RX ORDER — SODIUM CHLORIDE 0.9 % (FLUSH) 0.9 %
5-40 SYRINGE (ML) INJECTION EVERY 12 HOURS SCHEDULED
Status: DISCONTINUED | OUTPATIENT
Start: 2023-02-20 | End: 2023-02-20

## 2023-02-20 RX ORDER — ONDANSETRON 2 MG/ML
INJECTION INTRAMUSCULAR; INTRAVENOUS PRN
Status: DISCONTINUED | OUTPATIENT
Start: 2023-02-20 | End: 2023-02-20 | Stop reason: SDUPTHER

## 2023-02-20 RX ORDER — SODIUM CHLORIDE 0.9 % (FLUSH) 0.9 %
5-40 SYRINGE (ML) INJECTION PRN
Status: DISCONTINUED | OUTPATIENT
Start: 2023-02-20 | End: 2023-02-20

## 2023-02-20 RX ORDER — SODIUM CHLORIDE 9 MG/ML
INJECTION, SOLUTION INTRAVENOUS PRN
Status: DISCONTINUED | OUTPATIENT
Start: 2023-02-20 | End: 2023-02-20 | Stop reason: HOSPADM

## 2023-02-20 RX ORDER — SODIUM CHLORIDE 0.9 % (FLUSH) 0.9 %
5-40 SYRINGE (ML) INJECTION PRN
Status: DISCONTINUED | OUTPATIENT
Start: 2023-02-20 | End: 2023-02-20 | Stop reason: HOSPADM

## 2023-02-20 RX ORDER — DEXAMETHASONE SODIUM PHOSPHATE 4 MG/ML
INJECTION, SOLUTION INTRA-ARTICULAR; INTRALESIONAL; INTRAMUSCULAR; INTRAVENOUS; SOFT TISSUE PRN
Status: DISCONTINUED | OUTPATIENT
Start: 2023-02-20 | End: 2023-02-20 | Stop reason: SDUPTHER

## 2023-02-20 RX ORDER — KETOROLAC TROMETHAMINE 30 MG/ML
30 INJECTION, SOLUTION INTRAMUSCULAR; INTRAVENOUS ONCE
Status: COMPLETED | OUTPATIENT
Start: 2023-02-20 | End: 2023-02-20

## 2023-02-20 RX ORDER — PROPOFOL 10 MG/ML
INJECTION, EMULSION INTRAVENOUS PRN
Status: DISCONTINUED | OUTPATIENT
Start: 2023-02-20 | End: 2023-02-20 | Stop reason: SDUPTHER

## 2023-02-20 RX ORDER — GLYCOPYRROLATE 0.2 MG/ML
INJECTION INTRAMUSCULAR; INTRAVENOUS PRN
Status: DISCONTINUED | OUTPATIENT
Start: 2023-02-20 | End: 2023-02-20 | Stop reason: SDUPTHER

## 2023-02-20 RX ORDER — DROPERIDOL 2.5 MG/ML
0.62 INJECTION, SOLUTION INTRAMUSCULAR; INTRAVENOUS
Status: DISCONTINUED | OUTPATIENT
Start: 2023-02-20 | End: 2023-02-20

## 2023-02-20 RX ORDER — ROCURONIUM BROMIDE 10 MG/ML
INJECTION, SOLUTION INTRAVENOUS PRN
Status: DISCONTINUED | OUTPATIENT
Start: 2023-02-20 | End: 2023-02-20 | Stop reason: SDUPTHER

## 2023-02-20 RX ORDER — FENTANYL CITRATE 50 UG/ML
25 INJECTION, SOLUTION INTRAMUSCULAR; INTRAVENOUS EVERY 5 MIN PRN
Status: COMPLETED | OUTPATIENT
Start: 2023-02-20 | End: 2023-02-20

## 2023-02-20 RX ORDER — BUPIVACAINE HYDROCHLORIDE 5 MG/ML
INJECTION, SOLUTION EPIDURAL; INTRACAUDAL
Status: COMPLETED | OUTPATIENT
Start: 2023-02-20 | End: 2023-02-20

## 2023-02-20 RX ORDER — FENTANYL CITRATE 50 UG/ML
INJECTION, SOLUTION INTRAMUSCULAR; INTRAVENOUS PRN
Status: DISCONTINUED | OUTPATIENT
Start: 2023-02-20 | End: 2023-02-20 | Stop reason: SDUPTHER

## 2023-02-20 RX ORDER — OXYCODONE HYDROCHLORIDE 5 MG/1
5 TABLET ORAL
Status: DISCONTINUED | OUTPATIENT
Start: 2023-02-20 | End: 2023-02-20

## 2023-02-20 RX ORDER — SODIUM CHLORIDE 9 MG/ML
INJECTION, SOLUTION INTRAVENOUS PRN
Status: DISCONTINUED | OUTPATIENT
Start: 2023-02-20 | End: 2023-02-20

## 2023-02-20 RX ORDER — ONDANSETRON 2 MG/ML
4 INJECTION INTRAMUSCULAR; INTRAVENOUS
Status: DISCONTINUED | OUTPATIENT
Start: 2023-02-20 | End: 2023-02-20

## 2023-02-20 RX ORDER — MAGNESIUM HYDROXIDE 1200 MG/15ML
LIQUID ORAL CONTINUOUS PRN
Status: COMPLETED | OUTPATIENT
Start: 2023-02-20 | End: 2023-02-20

## 2023-02-20 RX ADMIN — FENTANYL CITRATE 25 MCG: 50 INJECTION INTRAMUSCULAR; INTRAVENOUS at 10:47

## 2023-02-20 RX ADMIN — OXYCODONE HYDROCHLORIDE 10 MG: 10 TABLET ORAL at 15:53

## 2023-02-20 RX ADMIN — LEVOFLOXACIN 500 MG: 5 INJECTION, SOLUTION INTRAVENOUS at 20:35

## 2023-02-20 RX ADMIN — FENTANYL CITRATE 25 MCG: 50 INJECTION, SOLUTION INTRAMUSCULAR; INTRAVENOUS at 12:44

## 2023-02-20 RX ADMIN — MIDAZOLAM 2 MG: 1 INJECTION INTRAMUSCULAR; INTRAVENOUS at 10:37

## 2023-02-20 RX ADMIN — GLYCOPYRROLATE 0.1 MG: 0.2 INJECTION, SOLUTION INTRAMUSCULAR; INTRAVENOUS at 10:37

## 2023-02-20 RX ADMIN — ROCURONIUM BROMIDE 5 MG: 100 INJECTION, SOLUTION INTRAVENOUS at 10:37

## 2023-02-20 RX ADMIN — SODIUM CHLORIDE: 9 INJECTION, SOLUTION INTRAVENOUS at 10:17

## 2023-02-20 RX ADMIN — HYDROMORPHONE HYDROCHLORIDE 0.5 MG: 1 INJECTION, SOLUTION INTRAMUSCULAR; INTRAVENOUS; SUBCUTANEOUS at 11:27

## 2023-02-20 RX ADMIN — FENTANYL CITRATE 25 MCG: 50 INJECTION INTRAMUSCULAR; INTRAVENOUS at 11:22

## 2023-02-20 RX ADMIN — SUGAMMADEX 50 MG: 100 INJECTION, SOLUTION INTRAVENOUS at 11:22

## 2023-02-20 RX ADMIN — METRONIDAZOLE 500 MG: 500 INJECTION, SOLUTION INTRAVENOUS at 03:25

## 2023-02-20 RX ADMIN — HYDROMORPHONE HYDROCHLORIDE 0.5 MG: 1 INJECTION, SOLUTION INTRAMUSCULAR; INTRAVENOUS; SUBCUTANEOUS at 11:37

## 2023-02-20 RX ADMIN — PROPOFOL 200 MG: 10 INJECTION, EMULSION INTRAVENOUS at 10:40

## 2023-02-20 RX ADMIN — SODIUM CHLORIDE, PRESERVATIVE FREE 10 ML: 5 INJECTION INTRAVENOUS at 20:36

## 2023-02-20 RX ADMIN — FENTANYL CITRATE 25 MCG: 50 INJECTION INTRAMUSCULAR; INTRAVENOUS at 11:25

## 2023-02-20 RX ADMIN — OXYCODONE HYDROCHLORIDE 10 MG: 10 TABLET ORAL at 19:50

## 2023-02-20 RX ADMIN — FENTANYL CITRATE 25 MCG: 50 INJECTION, SOLUTION INTRAMUSCULAR; INTRAVENOUS at 12:15

## 2023-02-20 RX ADMIN — METRONIDAZOLE 500 MG: 500 INJECTION, SOLUTION INTRAVENOUS at 09:37

## 2023-02-20 RX ADMIN — ONDANSETRON 4 MG: 2 INJECTION INTRAMUSCULAR; INTRAVENOUS at 10:46

## 2023-02-20 RX ADMIN — HYDROMORPHONE HYDROCHLORIDE 1 MG: 1 INJECTION, SOLUTION INTRAMUSCULAR; INTRAVENOUS; SUBCUTANEOUS at 22:25

## 2023-02-20 RX ADMIN — OXYCODONE HYDROCHLORIDE 10 MG: 10 TABLET ORAL at 23:50

## 2023-02-20 RX ADMIN — SODIUM CHLORIDE, PRESERVATIVE FREE 10 ML: 5 INJECTION INTRAVENOUS at 08:03

## 2023-02-20 RX ADMIN — HYDROMORPHONE HYDROCHLORIDE 0.5 MG: 1 INJECTION, SOLUTION INTRAMUSCULAR; INTRAVENOUS; SUBCUTANEOUS at 12:35

## 2023-02-20 RX ADMIN — FENTANYL CITRATE 25 MCG: 50 INJECTION INTRAMUSCULAR; INTRAVENOUS at 10:40

## 2023-02-20 RX ADMIN — FENTANYL CITRATE 25 MCG: 50 INJECTION, SOLUTION INTRAMUSCULAR; INTRAVENOUS at 12:49

## 2023-02-20 RX ADMIN — Medication 120 MG: at 10:40

## 2023-02-20 RX ADMIN — SODIUM CHLORIDE, POTASSIUM CHLORIDE, SODIUM LACTATE AND CALCIUM CHLORIDE: 600; 310; 30; 20 INJECTION, SOLUTION INTRAVENOUS at 08:05

## 2023-02-20 RX ADMIN — DEXAMETHASONE SODIUM PHOSPHATE 8 MG: 4 INJECTION, SOLUTION INTRAMUSCULAR; INTRAVENOUS at 10:46

## 2023-02-20 RX ADMIN — LIDOCAINE HYDROCHLORIDE 80 MG: 20 INJECTION, SOLUTION EPIDURAL; INFILTRATION; INTRACAUDAL; PERINEURAL at 10:40

## 2023-02-20 RX ADMIN — HYDROMORPHONE HYDROCHLORIDE 1 MG: 1 INJECTION, SOLUTION INTRAMUSCULAR; INTRAVENOUS; SUBCUTANEOUS at 17:05

## 2023-02-20 RX ADMIN — GLUCAGON HYDROCHLORIDE 1 MG: 1 INJECTION, POWDER, FOR SOLUTION INTRAMUSCULAR; INTRAVENOUS; SUBCUTANEOUS at 11:04

## 2023-02-20 RX ADMIN — ROCURONIUM BROMIDE 25 MG: 100 INJECTION, SOLUTION INTRAVENOUS at 10:45

## 2023-02-20 RX ADMIN — HYDROMORPHONE HYDROCHLORIDE 1 MG: 1 INJECTION, SOLUTION INTRAMUSCULAR; INTRAVENOUS; SUBCUTANEOUS at 14:47

## 2023-02-20 RX ADMIN — FENTANYL CITRATE 25 MCG: 50 INJECTION, SOLUTION INTRAMUSCULAR; INTRAVENOUS at 11:58

## 2023-02-20 RX ADMIN — SODIUM CHLORIDE, PRESERVATIVE FREE 40 MG: 5 INJECTION INTRAVENOUS at 08:03

## 2023-02-20 RX ADMIN — HYDROMORPHONE HYDROCHLORIDE 1 MG: 1 INJECTION, SOLUTION INTRAMUSCULAR; INTRAVENOUS; SUBCUTANEOUS at 20:32

## 2023-02-20 RX ADMIN — ONDANSETRON 4 MG: 2 INJECTION INTRAMUSCULAR; INTRAVENOUS at 22:02

## 2023-02-20 RX ADMIN — KETOROLAC TROMETHAMINE 30 MG: 30 INJECTION, SOLUTION INTRAMUSCULAR; INTRAVENOUS at 22:02

## 2023-02-20 RX ADMIN — METRONIDAZOLE 500 MG: 500 INJECTION, SOLUTION INTRAVENOUS at 17:59

## 2023-02-20 RX ADMIN — HYDROMORPHONE HYDROCHLORIDE 0.5 MG: 1 INJECTION, SOLUTION INTRAMUSCULAR; INTRAVENOUS; SUBCUTANEOUS at 12:28

## 2023-02-20 ASSESSMENT — PAIN SCALES - GENERAL
PAINLEVEL_OUTOF10: 4
PAINLEVEL_OUTOF10: 10
PAINLEVEL_OUTOF10: 5
PAINLEVEL_OUTOF10: 3
PAINLEVEL_OUTOF10: 7
PAINLEVEL_OUTOF10: 1
PAINLEVEL_OUTOF10: 5
PAINLEVEL_OUTOF10: 4
PAINLEVEL_OUTOF10: 3
PAINLEVEL_OUTOF10: 0
PAINLEVEL_OUTOF10: 10
PAINLEVEL_OUTOF10: 7
PAINLEVEL_OUTOF10: 1
PAINLEVEL_OUTOF10: 6
PAINLEVEL_OUTOF10: 10
PAINLEVEL_OUTOF10: 10
PAINLEVEL_OUTOF10: 7
PAINLEVEL_OUTOF10: 7
PAINLEVEL_OUTOF10: 6
PAINLEVEL_OUTOF10: 7

## 2023-02-20 ASSESSMENT — PAIN DESCRIPTION - LOCATION
LOCATION: ABDOMEN

## 2023-02-20 ASSESSMENT — PAIN DESCRIPTION - DESCRIPTORS
DESCRIPTORS: ACHING
DESCRIPTORS: ACHING;SHARP
DESCRIPTORS: ACHING
DESCRIPTORS: DULL;ACHING
DESCRIPTORS: ACHING
DESCRIPTORS: ACHING

## 2023-02-20 ASSESSMENT — PAIN - FUNCTIONAL ASSESSMENT
PAIN_FUNCTIONAL_ASSESSMENT: PREVENTS OR INTERFERES SOME ACTIVE ACTIVITIES AND ADLS
PAIN_FUNCTIONAL_ASSESSMENT: ACTIVITIES ARE NOT PREVENTED
PAIN_FUNCTIONAL_ASSESSMENT: PREVENTS OR INTERFERES SOME ACTIVE ACTIVITIES AND ADLS
PAIN_FUNCTIONAL_ASSESSMENT: PREVENTS OR INTERFERES SOME ACTIVE ACTIVITIES AND ADLS
PAIN_FUNCTIONAL_ASSESSMENT: ACTIVITIES ARE NOT PREVENTED
PAIN_FUNCTIONAL_ASSESSMENT: ACTIVITIES ARE NOT PREVENTED
PAIN_FUNCTIONAL_ASSESSMENT: 0-10
PAIN_FUNCTIONAL_ASSESSMENT: ACTIVITIES ARE NOT PREVENTED
PAIN_FUNCTIONAL_ASSESSMENT: ACTIVITIES ARE NOT PREVENTED

## 2023-02-20 ASSESSMENT — PAIN SCALES - WONG BAKER
WONGBAKER_NUMERICALRESPONSE: 0
WONGBAKER_NUMERICALRESPONSE: 4
WONGBAKER_NUMERICALRESPONSE: 10

## 2023-02-20 ASSESSMENT — PAIN DESCRIPTION - ORIENTATION
ORIENTATION: MID
ORIENTATION: RIGHT
ORIENTATION: MID;RIGHT
ORIENTATION: MID
ORIENTATION: RIGHT
ORIENTATION: MID
ORIENTATION: RIGHT;LEFT
ORIENTATION: MID
ORIENTATION: RIGHT
ORIENTATION: RIGHT
ORIENTATION: MID
ORIENTATION: LEFT
ORIENTATION: RIGHT;LEFT;MID
ORIENTATION: MID
ORIENTATION: RIGHT;MID

## 2023-02-20 ASSESSMENT — PAIN DESCRIPTION - ONSET: ONSET: SUDDEN

## 2023-02-20 ASSESSMENT — PAIN DESCRIPTION - PAIN TYPE: TYPE: ACUTE PAIN;SURGICAL PAIN

## 2023-02-20 ASSESSMENT — PAIN DESCRIPTION - FREQUENCY: FREQUENCY: CONTINUOUS

## 2023-02-20 ASSESSMENT — ENCOUNTER SYMPTOMS: SHORTNESS OF BREATH: 0

## 2023-02-20 NOTE — PROGRESS NOTES
Report called to [facility]W MARTHA Jose. All questions answered at this time. Patient states her pain at a 2/10. Patient is tired and would like to sleep. 5 incisions on abdomen clean dry and intact with surgical glue.

## 2023-02-20 NOTE — DISCHARGE INSTRUCTIONS
Melvina Garcia MD     General and Vascular Surgery   Yonathan Noonan MD     [address] [facility] MD Johnathan     [address] [facility] [address], [place] [facility] [address], [place] [facility] [facility] [address]  Lisseth Vazquez CNP    Phone: [phone]           Fax: [phone]                                                                 POST-OPERATIVE INSTRUCTIONS FOR CHOLECYSTECTOMY    Call the office to schedule your post-operative appointment with your surgeon for two (2) weeks. You will have a water occlusive glue closing your incisions. You may shower. Wash incisions gently, and pat them dry. Do not rub your incisions. Do not soak incisions in tub baths, hot tubs or pools    General guidelines for activity:  Avoid strenuous activity or lifting anything heavier than 15 pounds for 2 weeks. It is OK to be up walking around; walking up and down stairs is also OK. Do what is comfortable: stop and rest when you feel tired. Drink plenty of fluids and stay on a low-fat diet for 7 days after surgery. Do NOT drive for one week and while taking your narcotic pain medicine. Watch for signs of infection:   Fever over 100.5°   Excessive warmth or bright redness around your incisions  Leakage of bloody or cloudy fluid from you incisions    During the laparoscopic procedure that you had, gas is pumped into the abdominal cavity. You may feel abdominal, shoulder, or rib pain for a few days due to this. You will have pain medicine ordered. Take as directed. If you experience constipation  Increase your water intake, and activity; walking is best.  A stool softener or mild laxative may be necessary if you still have not had a bowel  movement ; call the office for further instructions.

## 2023-02-20 NOTE — ANESTHESIA POSTPROCEDURE EVALUATION
Department of Anesthesiology  Postprocedure Note    Patient: Mildred Bowman  MRN: [id number]  YOB: 2000  Date of evaluation: 2/20/2023      Procedure Summary     Date: 02/20/23 Room / Location: [address]    Anesthesia Start: 2396 Anesthesia Stop: 9968    Procedure: LAPAROSCOPIC CHOLECYSTECTOMY WITH CHOLANGIOGRAM (Abdomen) Diagnosis:       Acute cholecystitis      (ACUTE CHOLECYSTITIS)    Surgeons: Gabriela Cheng MD Responsible Provider: Aislinn Nunez MD    Anesthesia Type: general ASA Status: 1          Anesthesia Type: No value filed. Jojo Phase I: Jojo Score: 6    Jojo Phase II:        Anesthesia Post Evaluation    Patient location during evaluation: PACU  Patient participation: complete - patient participated  Level of consciousness: awake  Airway patency: patent  Nausea & Vomiting: no nausea and no vomiting  Cardiovascular status: blood pressure returned to baseline  Respiratory status: acceptable  Hydration status: stable  Comments: Vital signs stable  OK to discharge from Stage I post anesthesia care.   Care transferred from Anesthesiology department on discharge from perioperative area   Multimodal analgesia pain management approach

## 2023-02-20 NOTE — PROGRESS NOTES
Patient scheduled for surgery today, patient is A+Ox4 and mother is at bedside. Consent signed and placed in the chart - all questions answered. Pre-op checklist was completed and charted. Patient has been NPO since at least midnight. All piercing's removed, gown with snaps on. Transport here to  patient. All needs meet at this time.      Electronically signed by Meera Schmitz RN on 2/20/2023 at 9:57 AM

## 2023-02-20 NOTE — PROGRESS NOTES
Patient is resting in bed. Alert and oriented X 4. Complaining of pain, PRN pain medication given per order (see MAR). PM medications administered as ordered without complaint (see eMAR). IV infusing with 125ml/hr. Assessment complete. Patient UAL. VSS stable at this time. All patient needs are met at this time. Fall precautions are in place. Call light is in reach.

## 2023-02-20 NOTE — BRIEF OP NOTE
Brief Postoperative Note      Patient: Mildred Bowman  YOB: 2000  MRN: [id number]    Date of Procedure: 2/20/2023    Pre-Op Diagnosis: ACUTE CHOLECYSTITIS    Post-Op Diagnosis:  CHOLEDOCHOLITHIASIS       Procedure(s):  LAPAROSCOPIC CHOLECYSTECTOMY WITH CHOLANGIOGRAM    Surgeon(s):  Gabriela Cheng MD    Assistant:  Surgical Assistant: Katerina Persaud    Anesthesia: General    Estimated Blood Loss (mL): less than 50     Complications: None    Specimens:   ID Type Source Tests Collected by Time Destination   A : A) GALLBLADDER AND CONTENTS Tissue Tissue SURGICAL PATHOLOGY Gabriela Cheng MD 2/20/2023 1057        Implants:  * No implants in log *      Drains: * No LDAs found *    Findings: 2-3 stones in distal CBD unable to be removed with manuel    Electronically signed by Gabriela Cheng MD on 2/20/2023 at 11:27 AM

## 2023-02-20 NOTE — CONSULTS
GASTROENTEROLOGY INPATIENT CONSULTATION        IDENTIFYING DATA/REASON FOR CONSULTATION   PATIENT:  Joycelyn Greenberg  MRN:  [id number]  ADMIT DATE: 2/19/2023  TIME OF EVALUATION: 2/20/2023 1:25 PM  HOSPITAL STAY:   LOS: 1 day     REASON FOR CONSULTATION:  choledocholithiasis and hyperbilirubinemia     HISTORY OF PRESENT ILLNESS   Joycelyn Greenberg is a 25 y.o. female with no pertinent PMH who presented on 2/19/2023 with abdominal pain. Her pain is epigastric and RUQ, progressively worsening. She reports associated nausea, vomiting and worsening symptoms after eating. She has had dark colored urine and pale stool. No fevers or chills. Approximately 3 weeks ago she had a similar episode of pain that occurred overnight and resolved without intervention. We have been consulted for choledocholithiasis and hyperbilirubinemia. Patient underwent lap win with today with Dr. Ken Hutchinson. IOC showed 2-3 stones in distal CBD unable to be removed. PAST MEDICAL, SURGICAL, FAMILY, and SOCIAL HISTORY   History reviewed. No pertinent past medical history. Past Surgical History:   Procedure Laterality Date    CHOLECYSTECTOMY, LAPAROSCOPIC N/A 2/20/2023    LAPAROSCOPIC CHOLECYSTECTOMY WITH CHOLANGIOGRAM performed by Ailyn Olivas MD at [place] [address]     History reviewed. No pertinent family history.   Social History     Socioeconomic History    Marital status: Single     Spouse name: None    Number of children: None    Years of education: None    Highest education level: None   Tobacco Use    Smoking status: Never    Smokeless tobacco: Never   Substance and Sexual Activity    Alcohol use: Not Currently    Drug use: Yes     Frequency: 3.0 times per week     Types: Marijuana (Weed)     Comment: 3 times per week       MEDICATIONS   SCHEDULED:  sodium chloride flush, 5-40 mL, 2 times per day  enoxaparin, 40 mg, Daily  metroNIDAZOLE, 500 mg, Q8H  levofloxacin, 500 mg, Q24H  pantoprazole (PROTONIX) 40 mg injection, 40 mg, Daily      FLUIDS/DRIPS: sodium chloride      lactated ringers IV soln Stopped (02/20/23 1034)     PRNs: sodium chloride flush, 5-40 mL, PRN  sodium chloride, , PRN  ondansetron, 4 mg, Q8H PRN   Or  ondansetron, 4 mg, Q6H PRN  potassium chloride, 10 mEq, PRN  magnesium sulfate, 2,000 mg, PRN  acetaminophen, 650 mg, Q4H PRN  oxyCODONE, 5 mg, Q4H PRN   Or  oxyCODONE, 10 mg, Q4H PRN  HYDROmorphone, 0.5 mg, Q2H PRN   Or  HYDROmorphone, 1 mg, Q2H PRN  prochlorperazine, 10 mg, Q6H PRN  promethazine, 25 mg, Q6H PRN      ALLERGIES:  She No Known Allergies    REVIEW OF SYSTEMS   Pertinent ROS noted in HPI    PHYSICAL EXAM     Vitals:    02/20/23 1230 02/20/23 1245 02/20/23 1249 02/20/23 1300   BP: 124/78 122/78  114/73   Pulse: 59 50 66 55   Resp: 21 14 11 12   Temp:    97.8 °F (36.6 °C)   TempSrc:    Oral   SpO2: 96% 95% 94% 96%   Weight:       Height:           I/O last 3 completed shifts: In: 240 [P.O.:240]  Out: -       Physical Exam:  General appearance: alert, cooperative, no distress, appears stated age  Eyes: Anicteric  Head: Normocephalic, without obvious abnormality  Lungs: clear to auscultation bilaterally, Normal Effort  Heart: regular rate and rhythm, normal S1 and S2, no murmurs or rubs  Abdomen: soft, non-distended, non-tender. Bowel sounds normal. No masses,  no organomegaly.    Extremities: atraumatic, no cyanosis or edema  Skin: warm and dry, no jaundice  Neuro: Grossly intact, A&OX3      LABS AND IMAGING   Laboratory   Recent Labs     02/19/23  1353 02/20/23  0808   WBC 9.2 8.3   HGB 13.3 12.8   HCT 38.2 36.3   MCV 84.6 83.9    294     Recent Labs     02/19/23  1353 02/20/23  0808   * 140   K 3.9 4.2   CL 99 105   CO2 22 25   BUN 8 6*   CREATININE <0.5* 0.7     Recent Labs     02/19/23  1353 02/20/23  0808   * 111*   * 261*   BILIDIR  --  2.5*   BILITOT 3.0* 3.6*   ALKPHOS 140* 126     Recent Labs     02/19/23  1353   LIPASE 27.0     No results for input(s): PROTIME, INR in the last 72 hours.    Imaging  FL CHOLANGIOGRAM OR   Preliminary Result   Findings consistent with presence of choledocholithiasis as described above. CT ABDOMEN PELVIS W IV CONTRAST Additional Contrast? None   Preliminary Result   Intrahepatic and extrahepatic biliary ductal dilatation with underlying   cholelithiasis. Correlation with liver function test is recommended, which   will dictate the need for additional imaging such as MRI/MRCP or endoscopic   examination. T-shaped contraceptive device appears to be abnormally located and the left   arm appears to be overlying the superior wall of the uterine   myometrium/serosa, which may reflect partial perforation. Recommend   gynecological consultation. Apparent diffuse urinary bladder wall thickening, which may be due to under   distention. Correlate clinically with signs of cystitis. ASSESSMENT AND RECOMMENDATIONS   Nevin Medley is a 25 y.o. female with no pertinent PMH who presented on 2/19/2023 with abdominal pain. We have been consulted for choledocholithiasis and hyperbilirubinemia. IMPRESSION:    Choledocholithiasis s/p lap win 2/20/23 with Dr. Elfego Brown. IOC showed 2-3 stones in distal CBD unable to be removed. Abnormal liver enzymes, 2/2 #1      RECOMMENDATIONS:    -Planning for ERCP tomorrow with Dr. Simin Stanton. -NPO at midnight  -Continue supportive care for now.  -Monitor LFTs. If you have any questions or need any further information, please feel free to contact our consult team.  Thank you for allowing us to participate in the care of Nevin Medley. The note was completed using Dragon voice recognition transcription. Every effort was made to ensure accuracy; however, inadvertent transcription errors may be present despite my best efforts to edit errors.       Carina Mehta PA-C

## 2023-02-20 NOTE — PLAN OF CARE
Problem: Discharge Planning  Goal: Discharge to home or other facility with appropriate resources  Outcome: Progressing     Problem: Pain  Goal: Verbalizes/displays adequate comfort level or baseline comfort level  2/20/2023 1028 by Jasson Monterroso RN  Outcome: Progressing     Problem: ABCDS Injury Assessment  Goal: Absence of physical injury  2/20/2023 1028 by Jasson Monterroso RN  Outcome: Progressing  Flowsheets (Taken 2/20/2023 0245 by Mae Jamison)  Absence of Physical Injury: Implement safety measures based on patient assessment     Problem: Nutrition Deficit:  Goal: Optimize nutritional status  Outcome: Progressing

## 2023-02-20 NOTE — ANESTHESIA PRE PROCEDURE
Department of Anesthesiology  Preprocedure Note       Name:  Lazarus Loh   Age:  25 y.o.  :  2000                                          MRN:  [id number]         Date:  2023      Surgeon: Ade Mendoza):  Janina Olmos MD    Procedure: Procedure(s):  LAPAROSCOPIC CHOLECYSTECTOMY WITH CHOLANGIOGRAM, POSSIBLE OPEN    Medications prior to admission:   Prior to Admission medications    Not on File       Current medications:    Current Facility-Administered Medications   Medication Dose Route Frequency Provider Last Rate Last Admin    sodium chloride flush 0.9 % injection 5-40 mL  5-40 mL IntraVENous 2 times per day Marimar Mccarty MD        sodium chloride flush 0.9 % injection 5-40 mL  5-40 mL IntraVENous PRN Marimar Mccarty MD        0.9 % sodium chloride infusion   IntraVENous PRN Marimar Mccarty MD        sodium chloride flush 0.9 % injection 5-40 mL  5-40 mL IntraVENous 2 times per day Margarette Alfredo MD   10 mL at 23 0803    sodium chloride flush 0.9 % injection 5-40 mL  5-40 mL IntraVENous PRN Margarette Alfredo MD        0.9 % sodium chloride infusion   IntraVENous PRN Margarette Alfredo MD        ondansetron (ZOFRAN-ODT) disintegrating tablet 4 mg  4 mg Oral Q8H PRN Margarette Alfredo MD        Or    ondansetron [facility]) injection 4 mg  4 mg IntraVENous Q6H PRN Margarette Alfredo MD   4 mg at 235    enoxaparin (LOVENOX) injection 40 mg  40 mg SubCUTAneous Daily Margarette Alfredo MD        lactated ringers IV soln infusion   IntraVENous Continuous Margarette Alfredo  mL/hr at 23 0805 New Bag at 23 0805    potassium chloride 10 mEq/100 mL IVPB (Peripheral Line)  10 mEq IntraVENous PRN Margarette Alfredo MD        magnesium sulfate 2000 mg in 50 mL IVPB premix  2,000 mg IntraVENous PRN Margarette Alfredo MD        metronidazole (FLAGYL) 500 mg in 0.9% NaCl 100 mL IVPB premix  500 mg IntraVENous Q8H Haze Cheeks David Ignacio  mL/hr at 02/20/23 0937 500 mg at 02/20/23 0937    levoFLOXacin (LEVAQUIN) 500 MG/100ML infusion 500 mg  500 mg IntraVENous Q24H Feliberto Lester MD   Stopped at 02/19/23 2229    acetaminophen (TYLENOL) tablet 650 mg  650 mg Oral Q4H PRN Feliberto Lester MD        oxyCODONE (ROXICODONE) immediate release tablet 5 mg  5 mg Oral Q4H PRN Feliberto Lester MD        Or   Yamel Draft oxyCODONE HCl (OXY-IR) immediate release tablet 10 mg  10 mg Oral Q4H PRN Feliberto Lester MD        HYDROmorphone (DILAUDID) injection 0.5 mg  0.5 mg IntraVENous Q2H PRN Feliberto Lester MD        Or   Yamel Draft HYDROmorphone (DILAUDID) injection 1 mg  1 mg IntraVENous Q2H PRN Feliberto Lester MD        pantoprazole (PROTONIX) 40 mg in sodium chloride (PF) 0.9 % 10 mL injection  40 mg IntraVENous Daily Feliberto Lester MD   40 mg at 02/20/23 9935    prochlorperazine (COMPAZINE) injection 10 mg  10 mg IntraVENous Q6H PRN Feliberto Lester MD        promethazine (PHENERGAN) in sodium chloride 0.9% 50 mL IVPB SOLN 25 mg  25 mg IntraVENous Q6H PRN Lige Ek at 02/19/23 2323       Allergies:  No Known Allergies    Problem List:    Patient Active Problem List   Diagnosis Code    Choledocholithiasis K80.50       Past Medical History:  History reviewed. No pertinent past medical history. Past Surgical History:  History reviewed. No pertinent surgical history.     Social History:    Social History     Tobacco Use    Smoking status: Never    Smokeless tobacco: Never   Substance Use Topics    Alcohol use: Not Currently                                Counseling given: Not Answered      Vital Signs (Current):   Vitals:    02/19/23 1851 02/20/23 0749 02/20/23 0905 02/20/23 1003   BP: 115/68 118/77  107/67   Pulse: 50 53  56   Resp: 17   14   Temp: 98.5 °F (36.9 °C) 97.5 °F (36.4 °C)  96.8 °F (36 °C)   TempSrc: Oral Oral  Temporal   SpO2: 99% 99% 97% 99%   Weight:    167 lb (75.8 kg)   Height:    5' 10\" (1.778 m)                                              BP Readings from Last 3 Encounters:   02/20/23 107/67       NPO Status: Time of last liquid consumption: 1830                        Time of last solid consumption: 1830                        Date of last liquid consumption: 02/18/23                        Date of last solid food consumption: 02/19/23    BMI:   Wt Readings from Last 3 Encounters:   02/20/23 167 lb (75.8 kg)     Body mass index is 23.96 kg/m². CBC:   Lab Results   Component Value Date/Time    WBC 8.3 02/20/2023 08:08 AM    RBC 4.32 02/20/2023 08:08 AM    HGB 12.8 02/20/2023 08:08 AM    HCT 36.3 02/20/2023 08:08 AM    MCV 83.9 02/20/2023 08:08 AM    RDW 14.1 02/20/2023 08:08 AM     02/20/2023 08:08 AM       CMP:   Lab Results   Component Value Date/Time     02/20/2023 08:08 AM    K 4.2 02/20/2023 08:08 AM     02/20/2023 08:08 AM    CO2 25 02/20/2023 08:08 AM    BUN 6 02/20/2023 08:08 AM    CREATININE 0.7 02/20/2023 08:08 AM    AGRATIO 1.6 02/19/2023 01:53 PM    LABGLOM >60 02/20/2023 08:08 AM    GLUCOSE 102 02/20/2023 08:08 AM    PROT 6.7 02/20/2023 08:08 AM    CALCIUM 9.4 02/20/2023 08:08 AM    BILITOT 3.6 02/20/2023 08:08 AM    ALKPHOS 126 02/20/2023 08:08 AM     02/20/2023 08:08 AM     02/20/2023 08:08 AM       POC Tests: No results for input(s): POCGLU, POCNA, POCK, POCCL, POCBUN, POCHEMO, POCHCT in the last 72 hours.     Coags: No results found for: PROTIME, INR, APTT    HCG (If Applicable): No results found for: PREGTESTUR, PREGSERUM, HCG, HCGQUANT     ABGs: No results found for: PHART, PO2ART, HYE2XNN, RBM5TIW, BEART, [id number]     Type & Screen (If Applicable):  No results found for: LABABO, LABRH    Drug/Infectious Status (If Applicable):  No results found for: HIV, HEPCAB    COVID-19 Screening (If Applicable): No results found for: COVID19        Anesthesia Evaluation  Patient summary reviewed and Nursing notes reviewed no history of anesthetic complications:   Airway: Mallampati: I  TM distance: >3 FB   Neck ROM: full  Mouth opening: > = 3 FB   Dental:          Pulmonary:normal exam  breath sounds clear to auscultation      (-) pneumonia, asthma, shortness of breath, recent URI and sleep apnea                           Cardiovascular:  Exercise tolerance: good (>4 METS),       (-) hypertension, valvular problems/murmurs, past MI, CAD, CABG/stent, dysrhythmias,  angina,  GUERRERO and no pulmonary hypertension      Rhythm: regular  Rate: normal                    Neuro/Psych:      (-) seizures, TIA and CVA           GI/Hepatic/Renal:        (-) liver disease and no renal disease      ROS comment: choleliathiasis. Endo/Other:        (-) diabetes mellitus, hypothyroidism               Abdominal:             Vascular:     - PVD, DVT and PE. Other Findings:           Anesthesia Plan      general     ASA 1     (25 yo F with PMhx of choleliathiasis presenting for lap win. Appropriate NPO time. Denies current vomiting but slightly nauseated. Does have some ductal dilation on CT. I discussed with the patient the risks and benefits to general anesthesia including possible anesthetic complications but not limited to: PONV, damage to the airway and surrounding structures (teeth, lips, gums, tongue, etc.), adverse reactions to medications, cardiac complications (MI, CHF, arrhythmias, etc.), respiratory complications (post-op ventilation, respiratory failure, etc.), neurologic complications (nerve damage, stroke, seizure) and death. The patient was given the opportunity to ask questions and all questions were answered to the patient's satisfaction.  )  Induction: intravenous and rapid sequence. MIPS: Postoperative opioids intended and Prophylactic antiemetics administered. Anesthetic plan and risks discussed with patient. Plan discussed with CRNA.               This pre-anesthesia assessment may be used as a history and physical.    DOS STAFF ADDENDUM:    Pt seen and examined, chart reviewed (including anesthesia, drug and allergy history). No interval changes to history and physical examination. Anesthetic plan, risks, benefits, alternatives, and personnel involved discussed with patient. Patient verbalized an understanding and agrees to proceed.       Vipin Minor MD  February 20, 2023  10:17 AM

## 2023-02-20 NOTE — PROGRESS NOTES
Patient still experiencing some pain (see MAR for admin. details). This RN encouraged patient to partake in IS and early ambulation post-op. Patient states understanding. Patient and family updated on POC and all questions answered at this time. Will continue POC.      Electronically signed by Ree Curry RN on 2/20/2023 at 4:46 PM

## 2023-02-20 NOTE — PROGRESS NOTES
Pt complaining of severe nausea. Call placed to Dr. Marion Peres. New orders for Compazine 10mg IV q6h PRN and Phenergan 25mg IV q6h PRN. Starry given as well to help settle stomach.

## 2023-02-20 NOTE — H&P
Update History & Physical    The patient's History and Physical of February 19, 2023 was reviewed with the patient and I examined the patient. There was no change. Slight bump in Tbili otherwise improved transaminases. D/W GI, plan Laparoscopic cholecystectomy with cholangiogram, possible open today. If CBD stones present and unable to be removed laparoscopic then plan ERCP tomorrow. The surgical site was confirmed by the patient and me. Plan: The risks, benefits, expected outcome, and alternative to the recommended procedure have been discussed with the patient. Patient understands and wants to proceed with the procedure.      Electronically signed by Deion Melendez MD on 2/20/2023 at 10:08 AM

## 2023-02-20 NOTE — PROGRESS NOTES
Comprehensive Nutrition Assessment    Type and Reason for Visit:  Initial (NPO/decreased po prior to admission)    Nutrition Recommendations/Plan:   Nutrition most appropriate with current medical condition, may need encouragement as diet progresses      Malnutrition Assessment:  Malnutrition Status: At risk for malnutrition (Comment) (02/20/23 1173)      Nutrition Assessment:    Patient admitted with choledocholithiasis. NPO at this time, ERCP then open win planned this admission per EMR. Patient is at nutrition risk due to GI distress prior to admission likely interferring with nutritional intake. Weight loss not evident in recent months per EMR. Will monitor nutrition progression post procedures. Nutrition Related Findings:    last BM on 2/18/23; lytes and blood sugars within normal limits on 2/20/23 Wound Type: None       Current Nutrition Intake & Therapies:    Average Meal Intake: NPO  Average Supplements Intake: NPO  Diet NPO    Anthropometric Measures:  Height: 5' 10\" (177.8 cm)  Ideal Body Weight (IBW): 150 lbs (68 kg)       Current Body Weight: 167 lb 15 oz (76.2 kg),   IBW. Weight Source: Other (Comment) (actual weight noted, method not specified)  Current BMI (kg/m2): 24.1                          BMI Categories: Normal Weight (BMI 18.5-24. 9)    Estimated Daily Nutrient Needs:  Energy Requirements Based On: Kcal/kg  Weight Used for Energy Requirements: Current  Energy (kcal/day): 9509-4567 (25-30 kcal/76.2 kg)  Weight Used for Protein Requirements: Current  Protein (g/day): 91-99 (1.2-1.3 g/76.2 kg)  Method Used for Fluid Requirements: 1 ml/kcal  Fluid (ml/day):      Nutrition Diagnosis:   Inadequate protein-energy intake related to altered GI function, pain as evidenced by NPO or clear liquid status due to medical condition, nausea, vomiting    Nutrition Interventions:   Food and/or Nutrient Delivery: Continue NPO  Nutrition Education/Counseling:  (monitor need)  Coordination of Nutrition Care: Continue to monitor while inpatient       Goals:     Goals: Initiate PO diet       Nutrition Monitoring and Evaluation:      Food/Nutrient Intake Outcomes: Diet Advancement/Tolerance  Physical Signs/Symptoms Outcomes: Nutrition Focused Physical Findings, Biochemical Data, Nausea or Vomiting    Discharge Planning:     Too soon to determine     Karla CANTU5 [address] [facility], [address] [place] [address],   Contact: [phone]

## 2023-02-20 NOTE — PROGRESS NOTES
Patient is now back in room 3109, VSS on room air, patient sleeping but wakes up to voice. 5 stabs sites from surgery, all clean,dry,intact. Clear liquid diet ordered for patient, was able to drink some ice water without complications. Patient started back on LR infusion as ordered. Fall precautions are in place until patient is more awake. Patient states pain is 3/10 but denies need for medication at this time, no reports of nausea.      Electronically signed by Sami Jones RN on 2/20/2023 at 1:14 PM

## 2023-02-21 ENCOUNTER — ANESTHESIA (OUTPATIENT)
Dept: ENDOSCOPY | Age: 23
End: 2023-02-21
Payer: COMMERCIAL

## 2023-02-21 ENCOUNTER — APPOINTMENT (OUTPATIENT)
Dept: GENERAL RADIOLOGY | Age: 23
DRG: 263 | End: 2023-02-21
Payer: COMMERCIAL

## 2023-02-21 LAB
A/G RATIO: 1.4 (ref 1.1–2.2)
ALBUMIN SERPL-MCNC: 3.5 G/DL (ref 3.4–5)
ALP BLD-CCNC: 119 U/L (ref 40–129)
ALT SERPL-CCNC: 242 U/L (ref 10–40)
ANION GAP SERPL CALCULATED.3IONS-SCNC: 9 MMOL/L (ref 3–16)
AST SERPL-CCNC: 131 U/L (ref 15–37)
BILIRUB SERPL-MCNC: 3.3 MG/DL (ref 0–1)
BUN BLDV-MCNC: 7 MG/DL (ref 7–20)
CALCIUM SERPL-MCNC: 8.8 MG/DL (ref 8.3–10.6)
CHLORIDE BLD-SCNC: 104 MMOL/L (ref 99–110)
CO2: 25 MMOL/L (ref 21–32)
CREAT SERPL-MCNC: 0.6 MG/DL (ref 0.6–1.1)
GFR SERPL CREATININE-BSD FRML MDRD: >60 ML/MIN/{1.73_M2}
GLUCOSE BLD-MCNC: 117 MG/DL (ref 70–99)
HCG(URINE) PREGNANCY TEST: NEGATIVE
HCT VFR BLD CALC: 35.1 % (ref 36–48)
HEMOGLOBIN: 11.9 G/DL (ref 12–16)
MCH RBC QN AUTO: 28.7 PG (ref 26–34)
MCHC RBC AUTO-ENTMCNC: 33.9 G/DL (ref 31–36)
MCV RBC AUTO: 84.6 FL (ref 80–100)
ORGANISM: ABNORMAL
PDW BLD-RTO: 13.9 % (ref 12.4–15.4)
PLATELET # BLD: 290 K/UL (ref 135–450)
PMV BLD AUTO: 9.2 FL (ref 5–10.5)
POTASSIUM SERPL-SCNC: 3.9 MMOL/L (ref 3.5–5.1)
RBC # BLD: 4.14 M/UL (ref 4–5.2)
SODIUM BLD-SCNC: 138 MMOL/L (ref 136–145)
TOTAL PROTEIN: 6 G/DL (ref 6.4–8.2)
URINE CULTURE, ROUTINE: ABNORMAL
WBC # BLD: 14.5 K/UL (ref 4–11)

## 2023-02-21 PROCEDURE — 0FC98ZZ EXTIRPATION OF MATTER FROM COMMON BILE DUCT, VIA NATURAL OR ARTIFICIAL OPENING ENDOSCOPIC: ICD-10-PCS | Performed by: INTERNAL MEDICINE

## 2023-02-21 PROCEDURE — 7100000000 HC PACU RECOVERY - FIRST 15 MIN: Performed by: INTERNAL MEDICINE

## 2023-02-21 PROCEDURE — 3700000000 HC ANESTHESIA ATTENDED CARE: Performed by: INTERNAL MEDICINE

## 2023-02-21 PROCEDURE — 6370000000 HC RX 637 (ALT 250 FOR IP): Performed by: INTERNAL MEDICINE

## 2023-02-21 PROCEDURE — 85027 COMPLETE CBC AUTOMATED: CPT

## 2023-02-21 PROCEDURE — 2720000010 HC SURG SUPPLY STERILE: Performed by: INTERNAL MEDICINE

## 2023-02-21 PROCEDURE — C1769 GUIDE WIRE: HCPCS | Performed by: INTERNAL MEDICINE

## 2023-02-21 PROCEDURE — 2709999900 HC NON-CHARGEABLE SUPPLY: Performed by: INTERNAL MEDICINE

## 2023-02-21 PROCEDURE — 1200000000 HC SEMI PRIVATE

## 2023-02-21 PROCEDURE — 6360000002 HC RX W HCPCS: Performed by: INTERNAL MEDICINE

## 2023-02-21 PROCEDURE — 99024 POSTOP FOLLOW-UP VISIT: CPT | Performed by: SURGERY

## 2023-02-21 PROCEDURE — 6370000000 HC RX 637 (ALT 250 FOR IP): Performed by: SURGERY

## 2023-02-21 PROCEDURE — 7100000001 HC PACU RECOVERY - ADDTL 15 MIN: Performed by: INTERNAL MEDICINE

## 2023-02-21 PROCEDURE — 6360000002 HC RX W HCPCS: Performed by: SURGERY

## 2023-02-21 PROCEDURE — 2500000003 HC RX 250 WO HCPCS

## 2023-02-21 PROCEDURE — 84703 CHORIONIC GONADOTROPIN ASSAY: CPT

## 2023-02-21 PROCEDURE — 74330 X-RAY BILE/PANC ENDOSCOPY: CPT

## 2023-02-21 PROCEDURE — 94760 N-INVAS EAR/PLS OXIMETRY 1: CPT

## 2023-02-21 PROCEDURE — 6360000002 HC RX W HCPCS: Performed by: STUDENT IN AN ORGANIZED HEALTH CARE EDUCATION/TRAINING PROGRAM

## 2023-02-21 PROCEDURE — 36415 COLL VENOUS BLD VENIPUNCTURE: CPT

## 2023-02-21 PROCEDURE — C9113 INJ PANTOPRAZOLE SODIUM, VIA: HCPCS | Performed by: SURGERY

## 2023-02-21 PROCEDURE — 80053 COMPREHEN METABOLIC PANEL: CPT

## 2023-02-21 PROCEDURE — 6360000004 HC RX CONTRAST MEDICATION: Performed by: INTERNAL MEDICINE

## 2023-02-21 PROCEDURE — 2500000003 HC RX 250 WO HCPCS: Performed by: INTERNAL MEDICINE

## 2023-02-21 PROCEDURE — 2580000003 HC RX 258: Performed by: SURGERY

## 2023-02-21 PROCEDURE — 2500000003 HC RX 250 WO HCPCS: Performed by: SURGERY

## 2023-02-21 PROCEDURE — 6360000002 HC RX W HCPCS

## 2023-02-21 PROCEDURE — 3609015200 HC ERCP REMOVE CALCULI/DEBRIS BILIARY/PANCREAS DUCT: Performed by: INTERNAL MEDICINE

## 2023-02-21 PROCEDURE — 3700000001 HC ADD 15 MINUTES (ANESTHESIA): Performed by: INTERNAL MEDICINE

## 2023-02-21 PROCEDURE — 3609014900 HC ERCP W/SPHINCTEROTOMY &/OR PAPILLOTOMY: Performed by: INTERNAL MEDICINE

## 2023-02-21 RX ORDER — SODIUM CHLORIDE 0.9 % (FLUSH) 0.9 %
5-40 SYRINGE (ML) INJECTION PRN
Status: DISCONTINUED | OUTPATIENT
Start: 2023-02-21 | End: 2023-02-21

## 2023-02-21 RX ORDER — ROCURONIUM BROMIDE 10 MG/ML
INJECTION, SOLUTION INTRAVENOUS PRN
Status: DISCONTINUED | OUTPATIENT
Start: 2023-02-21 | End: 2023-02-21 | Stop reason: SDUPTHER

## 2023-02-21 RX ORDER — CALCIUM CARBONATE 200(500)MG
500 TABLET,CHEWABLE ORAL ONCE
Status: COMPLETED | OUTPATIENT
Start: 2023-02-21 | End: 2023-02-21

## 2023-02-21 RX ORDER — SODIUM CHLORIDE 9 MG/ML
INJECTION, SOLUTION INTRAVENOUS PRN
Status: DISCONTINUED | OUTPATIENT
Start: 2023-02-21 | End: 2023-02-21

## 2023-02-21 RX ORDER — IPRATROPIUM BROMIDE AND ALBUTEROL SULFATE 2.5; .5 MG/3ML; MG/3ML
1 SOLUTION RESPIRATORY (INHALATION)
Status: DISCONTINUED | OUTPATIENT
Start: 2023-02-21 | End: 2023-02-21

## 2023-02-21 RX ORDER — DEXAMETHASONE SODIUM PHOSPHATE 4 MG/ML
INJECTION, SOLUTION INTRA-ARTICULAR; INTRALESIONAL; INTRAMUSCULAR; INTRAVENOUS; SOFT TISSUE PRN
Status: DISCONTINUED | OUTPATIENT
Start: 2023-02-21 | End: 2023-02-21 | Stop reason: SDUPTHER

## 2023-02-21 RX ORDER — DICYCLOMINE HYDROCHLORIDE 10 MG/1
10 CAPSULE ORAL EVERY 6 HOURS SCHEDULED
Status: DISCONTINUED | OUTPATIENT
Start: 2023-02-22 | End: 2023-02-24 | Stop reason: HOSPADM

## 2023-02-21 RX ORDER — ONDANSETRON 2 MG/ML
INJECTION INTRAMUSCULAR; INTRAVENOUS PRN
Status: DISCONTINUED | OUTPATIENT
Start: 2023-02-21 | End: 2023-02-21 | Stop reason: SDUPTHER

## 2023-02-21 RX ORDER — MIDAZOLAM HYDROCHLORIDE 1 MG/ML
INJECTION INTRAMUSCULAR; INTRAVENOUS PRN
Status: DISCONTINUED | OUTPATIENT
Start: 2023-02-21 | End: 2023-02-21 | Stop reason: SDUPTHER

## 2023-02-21 RX ORDER — MEPERIDINE HYDROCHLORIDE 25 MG/ML
12.5 INJECTION INTRAMUSCULAR; INTRAVENOUS; SUBCUTANEOUS EVERY 5 MIN PRN
Status: DISCONTINUED | OUTPATIENT
Start: 2023-02-21 | End: 2023-02-21

## 2023-02-21 RX ORDER — PROPOFOL 10 MG/ML
INJECTION, EMULSION INTRAVENOUS PRN
Status: DISCONTINUED | OUTPATIENT
Start: 2023-02-21 | End: 2023-02-21 | Stop reason: SDUPTHER

## 2023-02-21 RX ORDER — SODIUM CHLORIDE 0.9 % (FLUSH) 0.9 %
5-40 SYRINGE (ML) INJECTION EVERY 12 HOURS SCHEDULED
Status: DISCONTINUED | OUTPATIENT
Start: 2023-02-21 | End: 2023-02-21

## 2023-02-21 RX ORDER — FENTANYL CITRATE 50 UG/ML
INJECTION, SOLUTION INTRAMUSCULAR; INTRAVENOUS PRN
Status: DISCONTINUED | OUTPATIENT
Start: 2023-02-21 | End: 2023-02-21 | Stop reason: SDUPTHER

## 2023-02-21 RX ORDER — PROCHLORPERAZINE EDISYLATE 5 MG/ML
5 INJECTION INTRAMUSCULAR; INTRAVENOUS
Status: DISCONTINUED | OUTPATIENT
Start: 2023-02-21 | End: 2023-02-21

## 2023-02-21 RX ORDER — KETOROLAC TROMETHAMINE 15 MG/ML
15 INJECTION, SOLUTION INTRAMUSCULAR; INTRAVENOUS EVERY 6 HOURS
Status: COMPLETED | OUTPATIENT
Start: 2023-02-21 | End: 2023-02-23

## 2023-02-21 RX ORDER — LABETALOL HYDROCHLORIDE 5 MG/ML
10 INJECTION, SOLUTION INTRAVENOUS
Status: DISCONTINUED | OUTPATIENT
Start: 2023-02-21 | End: 2023-02-21

## 2023-02-21 RX ORDER — LIDOCAINE HYDROCHLORIDE 20 MG/ML
INJECTION, SOLUTION EPIDURAL; INFILTRATION; INTRACAUDAL; PERINEURAL PRN
Status: DISCONTINUED | OUTPATIENT
Start: 2023-02-21 | End: 2023-02-21 | Stop reason: SDUPTHER

## 2023-02-21 RX ORDER — HYDRALAZINE HYDROCHLORIDE 20 MG/ML
10 INJECTION INTRAMUSCULAR; INTRAVENOUS
Status: DISCONTINUED | OUTPATIENT
Start: 2023-02-21 | End: 2023-02-21

## 2023-02-21 RX ORDER — FENTANYL CITRATE 50 UG/ML
50 INJECTION, SOLUTION INTRAMUSCULAR; INTRAVENOUS EVERY 5 MIN PRN
Status: DISCONTINUED | OUTPATIENT
Start: 2023-02-21 | End: 2023-02-21

## 2023-02-21 RX ORDER — FENTANYL CITRATE 50 UG/ML
25 INJECTION, SOLUTION INTRAMUSCULAR; INTRAVENOUS EVERY 5 MIN PRN
Status: DISCONTINUED | OUTPATIENT
Start: 2023-02-21 | End: 2023-02-21

## 2023-02-21 RX ORDER — ONDANSETRON 2 MG/ML
4 INJECTION INTRAMUSCULAR; INTRAVENOUS
Status: DISCONTINUED | OUTPATIENT
Start: 2023-02-21 | End: 2023-02-21

## 2023-02-21 RX ADMIN — LIDOCAINE HYDROCHLORIDE 100 MG: 20 INJECTION, SOLUTION EPIDURAL; INFILTRATION; INTRACAUDAL; PERINEURAL at 12:27

## 2023-02-21 RX ADMIN — SODIUM CHLORIDE: 9 INJECTION, SOLUTION INTRAVENOUS at 12:23

## 2023-02-21 RX ADMIN — HYDROMORPHONE HYDROCHLORIDE 0.5 MG: 1 INJECTION, SOLUTION INTRAMUSCULAR; INTRAVENOUS; SUBCUTANEOUS at 22:02

## 2023-02-21 RX ADMIN — ONDANSETRON 4 MG: 2 INJECTION INTRAMUSCULAR; INTRAVENOUS at 05:11

## 2023-02-21 RX ADMIN — MIDAZOLAM 2 MG: 1 INJECTION INTRAMUSCULAR; INTRAVENOUS at 12:26

## 2023-02-21 RX ADMIN — PROPOFOL 200 MG: 10 INJECTION, EMULSION INTRAVENOUS at 12:27

## 2023-02-21 RX ADMIN — KETOROLAC TROMETHAMINE 15 MG: 15 INJECTION, SOLUTION INTRAMUSCULAR; INTRAVENOUS at 22:57

## 2023-02-21 RX ADMIN — FENTANYL CITRATE 25 MCG: 50 INJECTION, SOLUTION INTRAMUSCULAR; INTRAVENOUS at 13:39

## 2023-02-21 RX ADMIN — FENTANYL CITRATE 50 MCG: 50 INJECTION INTRAMUSCULAR; INTRAVENOUS at 12:23

## 2023-02-21 RX ADMIN — SUGAMMADEX 200 MG: 100 INJECTION, SOLUTION INTRAVENOUS at 12:54

## 2023-02-21 RX ADMIN — KETOROLAC TROMETHAMINE 15 MG: 15 INJECTION, SOLUTION INTRAMUSCULAR; INTRAVENOUS at 17:33

## 2023-02-21 RX ADMIN — DICYCLOMINE HYDROCHLORIDE 10 MG: 10 CAPSULE ORAL at 23:35

## 2023-02-21 RX ADMIN — ROCURONIUM BROMIDE 40 MG: 10 INJECTION INTRAVENOUS at 12:27

## 2023-02-21 RX ADMIN — KETOROLAC TROMETHAMINE 15 MG: 15 INJECTION, SOLUTION INTRAMUSCULAR; INTRAVENOUS at 10:54

## 2023-02-21 RX ADMIN — METRONIDAZOLE 500 MG: 500 INJECTION, SOLUTION INTRAVENOUS at 09:20

## 2023-02-21 RX ADMIN — SODIUM CHLORIDE, PRESERVATIVE FREE 40 MG: 5 INJECTION INTRAVENOUS at 09:18

## 2023-02-21 RX ADMIN — LIDOCAINE HYDROCHLORIDE: 20 SOLUTION ORAL; TOPICAL at 23:43

## 2023-02-21 RX ADMIN — ONDANSETRON 4 MG: 2 INJECTION INTRAMUSCULAR; INTRAVENOUS at 12:38

## 2023-02-21 RX ADMIN — METRONIDAZOLE 500 MG: 500 INJECTION, SOLUTION INTRAVENOUS at 02:07

## 2023-02-21 RX ADMIN — OXYCODONE 5 MG: 5 TABLET ORAL at 18:59

## 2023-02-21 RX ADMIN — ANTACID TABLETS 500 MG: 500 TABLET, CHEWABLE ORAL at 23:36

## 2023-02-21 RX ADMIN — LEVOFLOXACIN 500 MG: 5 INJECTION, SOLUTION INTRAVENOUS at 20:00

## 2023-02-21 RX ADMIN — SODIUM CHLORIDE, PRESERVATIVE FREE 10 ML: 5 INJECTION INTRAVENOUS at 09:18

## 2023-02-21 RX ADMIN — Medication 25 MG: at 23:03

## 2023-02-21 RX ADMIN — OXYCODONE HYDROCHLORIDE 10 MG: 10 TABLET ORAL at 22:57

## 2023-02-21 RX ADMIN — HYDROMORPHONE HYDROCHLORIDE 0.5 MG: 1 INJECTION, SOLUTION INTRAMUSCULAR; INTRAVENOUS; SUBCUTANEOUS at 19:58

## 2023-02-21 RX ADMIN — DEXAMETHASONE SODIUM PHOSPHATE 10 MG: 4 INJECTION, SOLUTION INTRAMUSCULAR; INTRAVENOUS at 12:36

## 2023-02-21 RX ADMIN — METRONIDAZOLE 500 MG: 500 INJECTION, SOLUTION INTRAVENOUS at 17:36

## 2023-02-21 RX ADMIN — SODIUM CHLORIDE, POTASSIUM CHLORIDE, SODIUM LACTATE AND CALCIUM CHLORIDE: 600; 310; 30; 20 INJECTION, SOLUTION INTRAVENOUS at 05:12

## 2023-02-21 RX ADMIN — OXYCODONE HYDROCHLORIDE 10 MG: 10 TABLET ORAL at 05:11

## 2023-02-21 ASSESSMENT — PAIN - FUNCTIONAL ASSESSMENT
PAIN_FUNCTIONAL_ASSESSMENT: PREVENTS OR INTERFERES SOME ACTIVE ACTIVITIES AND ADLS
PAIN_FUNCTIONAL_ASSESSMENT: ACTIVITIES ARE NOT PREVENTED
PAIN_FUNCTIONAL_ASSESSMENT: 0-10
PAIN_FUNCTIONAL_ASSESSMENT: ACTIVITIES ARE NOT PREVENTED

## 2023-02-21 ASSESSMENT — PAIN SCALES - GENERAL
PAINLEVEL_OUTOF10: 7
PAINLEVEL_OUTOF10: 4
PAINLEVEL_OUTOF10: 6
PAINLEVEL_OUTOF10: 6
PAINLEVEL_OUTOF10: 5
PAINLEVEL_OUTOF10: 3
PAINLEVEL_OUTOF10: 2
PAINLEVEL_OUTOF10: 7
PAINLEVEL_OUTOF10: 5
PAINLEVEL_OUTOF10: 3
PAINLEVEL_OUTOF10: 2
PAINLEVEL_OUTOF10: 6
PAINLEVEL_OUTOF10: 0
PAINLEVEL_OUTOF10: 1
PAINLEVEL_OUTOF10: 5
PAINLEVEL_OUTOF10: 5
PAINLEVEL_OUTOF10: 4

## 2023-02-21 ASSESSMENT — PAIN DESCRIPTION - ONSET
ONSET: ON-GOING
ONSET: ON-GOING

## 2023-02-21 ASSESSMENT — PAIN DESCRIPTION - DESCRIPTORS
DESCRIPTORS: ACHING

## 2023-02-21 ASSESSMENT — PAIN DESCRIPTION - LOCATION
LOCATION: ABDOMEN

## 2023-02-21 ASSESSMENT — PAIN DESCRIPTION - ORIENTATION
ORIENTATION: RIGHT
ORIENTATION: RIGHT
ORIENTATION: RIGHT;LOWER
ORIENTATION: RIGHT
ORIENTATION: RIGHT
ORIENTATION: MID
ORIENTATION: RIGHT;MID
ORIENTATION: RIGHT

## 2023-02-21 ASSESSMENT — PAIN DESCRIPTION - FREQUENCY
FREQUENCY: CONTINUOUS
FREQUENCY: CONTINUOUS

## 2023-02-21 ASSESSMENT — PAIN SCALES - WONG BAKER
WONGBAKER_NUMERICALRESPONSE: 2
WONGBAKER_NUMERICALRESPONSE: 0
WONGBAKER_NUMERICALRESPONSE: 0
WONGBAKER_NUMERICALRESPONSE: 4
WONGBAKER_NUMERICALRESPONSE: 4

## 2023-02-21 ASSESSMENT — PAIN DESCRIPTION - PAIN TYPE
TYPE: SURGICAL PAIN
TYPE: SURGICAL PAIN;ACUTE PAIN

## 2023-02-21 ASSESSMENT — ENCOUNTER SYMPTOMS: SHORTNESS OF BREATH: 0

## 2023-02-21 NOTE — OP NOTE
Endoscopy Note    Patient: Gina Connelly   : 2000  Acct#:     Procedure:  ERCP with biliary sphincterotomy  ERCP with stone extraction  Interpretation of fluoroscopy    Date: 2023    Surgeon:  José Miguel Hwang MD, MD    Referring Physician:  Dr. Estrella Schirmer    Anesthesia:  General per Anesthesia. Indications:  25year old with bile duct stone on intraoperative cholangiogram and obstructive jaundice and abdominal pain. Consent: Informed consent was obtained from the patient after explanation of indications, benefits, possible risks and complications of the procedure. Specifically the risk of bleeding, infection, perforation, pancreatitis, anesthesia complications, and remote possibility of mortality among others were explained. Monitoring: Patient was monitored with continuous pulse oximetry, telemetry, and intermittent blood pressures. Procedure:   A time-out was performed. The patient and staff were in agreement as to the correct patient and procedure. The above anesthesia was administered by the anesthesia department. The patient was placed in the left lateral prone position. The Olympus therapeutic duodenoscope was placed in the patient's mouth and blindly advanced into the esophagus. The scope was then advanced through the esophagus, stomach and into the second portion of the duodenum where the major papilla was visualized. Major Papilla: Normal.   Film:  Clips in the RUQ. Pancreatogram: During attempts to cannulate the bile duct, the pancreatic duct was superficially cannulated with a Jagtome. Next, the 0.025\" Georgann Nova was passed gently and easily along the course of the pancreatic duct by the endoscopist without the use of contrast (to minimize the risk of post-ERCP pancreatitis). The wire was used to guide biliary cannulation. Pancreatic wire access was lost during biliary work. Cholangiogram:  The biliary orifice was then selectively cannulated with a Jagtome. Contrast was injected and revealed small filling defects in the distal bile duct. The extrahepatic duct was mildly dilated. No bile leak. Normal intrahepatics. Next, a 0.025\" Leander Janina was passed without resistance into the bile duct and using the wire as a guide and using blended cautery, a 1cm biliary sphincterotomy was performed to the 12:00 position without complications. Next, several balloon sweeps were made with an extraction balloon removing multiple small stone fragments. After negative balloon sweeps, completion occlusion cholangiogram was performed which revealed no residual filling defects. Next, several further balloon sweeps were performed to clear the contrast from the duct and the procedure was terminated. The cannulas were then withdrawn. The duodenum and stomach were decompressed and the scope was withdrawn from the patient. The patient tolerated the procedure well and was taken to the post anesthesia care unit in good condition. Radiological Interpretation:  All fluoroscopic images and still x-ray films were carefullly examined and interpreted by the endoscopist on the spot during the procedure in the absence of radiologist.  These interpretations guided the course of the procedure and the interventions mentioned above and below. Estimated blood loss: minimal  Specimens taken: none      Impression:  1. Choledocolithiasis s/p biliary sphincterotomy and stone extraction with complete clearance documented by completion occlusion cholangiogram.  Indomethacin not given since she is on scheduled toradol. Recommendations:  1. Clear diet. If does well overnight can advance diet as tolerated tomorrow. 2.  GI consult service to follow.     Sierra Borja MD  [address] [facility],[address]  2/21/2023

## 2023-02-21 NOTE — PROGRESS NOTES
Patient admitted to PACU # 4 from OR at 1302 post ERCP BALLOON SWEEP W/ STONE REMOVAL, ERCP SPHINCTEROTOMY per Dr Jatinder Thurman. Attached to PACU monitoring system and report received from anesthesia provider. Patient was reported to be hemodynamically stable during procedure. Patient drowsy on admission and denied pain.

## 2023-02-21 NOTE — PROGRESS NOTES
Patient is now back from surgery, VSS on room air - patient denies the need for any PRN at this time. Awake to voice but drowsy at this time. Mother is at bedside. All needs meet at this time, will continue with POC.      Electronically signed by Girtha Essex, RN on 2/21/2023 at 2:17 PM

## 2023-02-21 NOTE — PROGRESS NOTES
Patient A+Ox4 - consent signed and placed in chart. Pre-op checklist completed. All questions answered at this time. Electronically signed by Elspeth Dubin, RN on 2/21/2023 at 10:40 AM    Urine obtained for pregnancy test, sent to Lab.      Electronically signed by Elspeth Dubin, RN on 2/21/2023 at 11:01 AM

## 2023-02-21 NOTE — PROGRESS NOTES
Patient returned to unit from PACU and was placed back into room 3109. Patient A&Ox4 but groggy. Patient reports 3/10 R sided abdominal pain but denies the need for PRN interventions. Patient denies nausea. IV fluids infusing per orders via IV in L AC. Patient aware of clear liquid diet in place. Water and ice chips provided per request. Patient denies further needs. Bed alarm on, patient educated to call out prior to ambulation. Patient reports understanding. Update provided to patient's primary RN Damon.

## 2023-02-21 NOTE — PROGRESS NOTES
General and Vascular Surgery                                                           Daily Progress Note                                                                 Pt Name: Catie De Jesus  Medical Record Number: [id number]  Date of Birth 2000   Today's Date: 2/21/2023    Chief Complaint: abdominal pain    ASSESSMENT/PLAN  Choledocholithiasis s/p lap win with gram    -Pain control an issue last evening. Improved with toradol. Will add scheduled toradol   -LFTs stable. -diet as tolerated post-ERCP  -ambulate      SUBJECTIVE  [facility] reports pain better controlled this AM after toradol dose last evening. OBJECTIVE  VITALS:   Vitals:    02/21/23 0541 02/21/23 0548 02/21/23 0723 02/21/23 1021   BP:   110/64    Pulse:   51    Resp: 18  18    Temp:   98.4 °F (36.9 °C)    TempSrc:   Oral    SpO2:   97% 97%   Weight:  172 lb 13.5 oz (78.4 kg)     Height:         I/O last 3 completed shifts:   In: 3590 [P.O.:480; I.V.:3110]  Out: -   GENERAL: alert, no distress  LUNGS: clear to ausculation, without wheezes, rales or rhonci  HEART: normal rate and regular rhythm  ABDOMEN: soft, appropriately tender, wounds clean  EXTREMITY: no cyanosis, clubbing or edema      LABS  CBC:   Lab Results   Component Value Date/Time    WBC 14.5 02/21/2023 05:00 AM    RBC 4.14 02/21/2023 05:00 AM    HGB 11.9 02/21/2023 05:00 AM    HCT 35.1 02/21/2023 05:00 AM    MCV 84.6 02/21/2023 05:00 AM    MCH 28.7 02/21/2023 05:00 AM    MCHC 33.9 02/21/2023 05:00 AM    RDW 13.9 02/21/2023 05:00 AM     02/21/2023 05:00 AM    MPV 9.2 02/21/2023 05:00 AM     CMP:    Lab Results   Component Value Date/Time     02/21/2023 05:00 AM    K 3.9 02/21/2023 05:00 AM    K 4.2 02/20/2023 08:08 AM     02/21/2023 05:00 AM    CO2 25 02/21/2023 05:00 AM    BUN 7 02/21/2023 05:00 AM    CREATININE 0.6 02/21/2023 05:00 AM    AGRATIO 1.4 02/21/2023 05:00 AM    LABGLOM >60 02/21/2023 05:00 AM    GLUCOSE 117 02/21/2023 05:00 AM    PROT 6.0 02/21/2023 05:00 AM    LABALBU 3.5 02/21/2023 05:00 AM    CALCIUM 8.8 02/21/2023 05:00 AM    BILITOT 3.3 02/21/2023 05:00 AM    ALKPHOS 119 02/21/2023 05:00 AM     02/21/2023 05:00 AM     02/21/2023 05:00 AM         Linn Nyhan, MD  Electronically signed 2/21/2023 at 10:48 AM

## 2023-02-21 NOTE — ANESTHESIA POSTPROCEDURE EVALUATION
Department of Anesthesiology  Postprocedure Note    Patient: Larisa Ellis  MRN: [id number]  YOB: 2000  Date of evaluation: 2/21/2023      Procedure Summary     Date: 02/21/23 Room / Location: [place] [address] / [facility]    Anesthesia Start: 1223 Anesthesia Stop:     Procedures:       ERCP BALLOON SWEEP W/ STONE REMOVAL      ERCP SPHINCTEROTOMY Diagnosis:       Choledocholithiasis      (Choledocholithiasis)    Surgeons: Jamari Diallo MD Responsible Provider: Mamadou Tucker MD    Anesthesia Type: general ASA Status: 1          Anesthesia Type: General    Jojo Phase I: Jojo Score: 9    Jojo Phase II:        Anesthesia Post Evaluation    Patient location during evaluation: PACU  Patient participation: complete - patient participated  Level of consciousness: sleepy but conscious  Airway patency: patent  Nausea & Vomiting: no nausea and no vomiting  Complications: no  Cardiovascular status: hemodynamically stable and blood pressure returned to baseline  Respiratory status: spontaneous ventilation, nonlabored ventilation and room air  Hydration status: stable  Comments: Ms. Ellis was seen resting comfortably following procedure. Will allow patient to become more alert before anticipated return to floor. No concerns at this time.

## 2023-02-21 NOTE — H&P
Pre-operative History and Physical    Patient: Oval Councilman  : 2000  Acct#:     HISTORY OF PRESENT ILLNESS:    The patient is a 25 y.o. female who presents with bile duct stone. Past Medical History:    History reviewed. No pertinent past medical history. Past Surgical History:        Procedure Laterality Date    CHOLECYSTECTOMY, LAPAROSCOPIC N/A 2023    LAPAROSCOPIC CHOLECYSTECTOMY WITH CHOLANGIOGRAM performed by Rosa Maria Fall MD at [facility] [place] [address]      Medications Prior to Admission:   No current facility-administered medications on file prior to encounter. No current outpatient medications on file prior to encounter. Allergies:  Patient has no known allergies. Social History:   Social History     Socioeconomic History    Marital status: Single     Spouse name: Not on file    Number of children: Not on file    Years of education: Not on file    Highest education level: Not on file   Occupational History    Not on file   Tobacco Use    Smoking status: Never    Smokeless tobacco: Never   Substance and Sexual Activity    Alcohol use: Not Currently    Drug use: Yes     Frequency: 3.0 times per week     Types: Marijuana ([place])     Comment: 3 times per week    Sexual activity: Not on file   Other Topics Concern    Not on file   Social History Narrative    Not on file     Social Determinants of Health     Financial Resource Strain: Not on file   Food Insecurity: Not on file   Transportation Needs: Not on file   Physical Activity: Not on file   Stress: Not on file   Social Connections: Not on file   Intimate Partner Violence: Not on file   Housing Stability: Not on file      Family History:   History reviewed. No pertinent family history.      PHYSICAL EXAM:      /62   Pulse 57   Temp 98.2 °F (36.8 °C) (Temporal)   Resp 16   Ht 5' 10\" (1.778 m)   Wt 172 lb 13.5 oz (78.4 kg)   SpO2 100%   BMI 24.80 kg/m²  I        Heart:  RRR    Lungs:  CTA b    Abdomen:  S/mildly tender/ND/+BS      ASSESSMENT AND PLAN:  ASA: per anesthesia  Mallampati: per anesthesia  1. Patient is a 25 y.o. female here for ERCP   2. Procedure options, risks and benefits reviewed with the patient. The patient expresses understanding.     Ephraim Eugene

## 2023-02-21 NOTE — OP NOTE
[address] [place] [address]                                OPERATIVE REPORT    PATIENT NAME: Meghna Antony                        :        2000  MED REC NO:   [id number]                          ROOM:       3109  ACCOUNT NO:   [de-identified]                           ADMIT DATE: 2023  PROVIDER:     Kay Thompson MD    DATE OF PROCEDURE:  2023    PREOPERATIVE DIAGNOSIS:  Cholecystitis. POSTOPERATIVE DIAGNOSIS:  Choledocholithiasis. OPERATION PERFORMED:  Laparoscopic cholecystectomy with cholangiogram.    SURGEON:  Kay Thompson MD    ANESTHESIA:  General endotracheal anesthesia. ASA CLASS:  I.    ANTIBIOTICS:  Scheduled Levaquin and Flagyl. DVT PROPHYLAXIS:  Bilateral pneumatic compression devices. ESTIMATED BLOOD LOSS:  Less than 50 mL. SPECIMENS:  Gallbladder and contents. INDICATIONS:  The patient is a 28-year-old female who presents with a  two to three-day history of epigastric right upper quadrant pain. CT  showed cholelithiasis with dilated intra and extrahepatic bile ducts. She was admitted for further observation. Her LFTs slightly improved  with a worsening T-bili and discussion was made with GI about  availability of ERCP. We discussed about possible lap win with  intraoperative cholangiogram today to evaluate her bile ducts to forego  an MRCP and/or ERCP if the duct to be cleared of stones. Risks of  bleeding, infection, anesthesia, risk of injuring surrounding structures  requiring an open procedure as well as possible need for ERCP tomorrow  were discussed with the patient and her mom and they were agreeable to  proceed. OPERATIVE PROCEDURE:  The patient was brought to the operating suite and  placed in the supine position on the operating room table. General  anesthesia was induced that she tolerated well.   The abdomen was prepped  and draped in the usual sterile fashion and a time-out performed. After  injecting local anesthetic, a 5 mm infraumbilical incision was  performed. Veress needle was used to insufflate the abdomen with carbon  dioxide gas. A 5 mm trocar was then inserted and the laparoscope then  followed. No injury from Veress or trocar placement was noted. A 12 mm  port was placed in the epigastrium and two 5 mm ports across the right  upper quadrant. The gallbladder was distended and I was able to aspirate  the luminal contents with an ovarian needle to be able to grasp this and  elevate it above the dome of the liver. I began my dissection in the  triangle of Calot. Dilated cystic duct was noted and also dilated  common bile duct at the confluence was also appreciated. A  circumferential dissection around the cystic duct, clip was placed on  the gallbladder side and a ductotomy was performed. Cholangiogram catheter was then inserted. Contrast flowed through the  cystic duct into the common bile duct as well as bilateral hepatic  ducts. It showed a meniscus sign with outflow into the duodenum  consistent with choledocholithiasis. As a result, a milligram of  glucagon was given by anesthesia and we waited 3 minutes. I ran a #4  biliary Nathaly transcystically multiple times and was confirmed into  the duodenum. I was hopeful that I had pushed the stones through. However, repeat cholangiogram showed now there was contrast into the  duodenum; however, the stones persisted. It was felt that given two to  three larger stones that would require an ERCP. The catheter was  removed. The cystic duct was clipped and then also ligated with a PDS  Endoloop given that distention and the back pressure. Cystic artery was  identified and clipped and divided. The gallbladder was removed from  the hepatic fossa using cautery and then placed in the laparoscopic  retrieval bag and brought out through the epigastric trocar site.   We  turned our attention back to the hepatic fossa and hemostasis was  reassured. Remainder of the trocars were withdrawn after closing the  epigastric fascia with a 0 Vicryl using laparoscopic suture passer. Wounds were closed with 4-0 Vicryl subcuticularly and then Dermabond  applied. The patient tolerated the procedure well. She was extubated  in the operating room and taken to PACU in stable condition. All counts  were correct at the end of the case. We will plan on notifying GI for  the need for ERCP tomorrow.         Elizabeth Roach MD    D: 02/20/2023 13:50:04       T: 02/20/2023 16:20:50     [email or web address]  Job#: [id number]     Doc#: [id number]    CC:

## 2023-02-21 NOTE — ANESTHESIA PRE PROCEDURE
Department of Anesthesiology  Preprocedure Note       Name:  Francie Fleischer   Age:  25 y.o.  :  2000                                          MRN:  [id number]         Date:  2023      Surgeon: Zeus Ponce):  Bear Faulkner MD    Procedure: Procedure(s):  ENDOSCOPIC RETROGRADE CHOLANGIOPANCREATOGRAPHY    Medications prior to admission:   Prior to Admission medications    Not on File       Current medications:    No current facility-administered medications for this visit. No current outpatient medications on file.      Facility-Administered Medications Ordered in Other Visits   Medication Dose Route Frequency Provider Last Rate Last Admin    ketorolac (TORADOL) injection 15 mg  15 mg IntraVENous Q6H Pavan Wagner MD   15 mg at 23 1054    sodium chloride flush 0.9 % injection 5-40 mL  5-40 mL IntraVENous 2 times per day Pavan Wagner MD   10 mL at 23 0918    sodium chloride flush 0.9 % injection 5-40 mL  5-40 mL IntraVENous PRN Pavan Wagner MD        0.9 % sodium chloride infusion   IntraVENous PRN Pavan Wagner MD        ondansetron (ZOFRAN-ODT) disintegrating tablet 4 mg  4 mg Oral Q8H PRN Pavan Wagner MD        Or    ondansetron [facility]) injection 4 mg  4 mg IntraVENous Q6H PRN Pavan Wagner MD   4 mg at 23 0511    enoxaparin (LOVENOX) injection 40 mg  40 mg SubCUTAneous Daily Pavan Wagner MD        lactated ringers IV soln infusion   IntraVENous Continuous Pavan Wagner  mL/hr at 23 0512 New Bag at 23 0512    potassium chloride 10 mEq/100 mL IVPB (Peripheral Line)  10 mEq IntraVENous PRN Pavan Wagner MD        magnesium sulfate 2000 mg in 50 mL IVPB premix  2,000 mg IntraVENous PRN Pavan Wagner MD        metronidazole (FLAGYL) 500 mg in 0.9% NaCl 100 mL IVPB premix  500 mg IntraVENous Q8H Pavan Wagner MD   Stopped at 23 1027    levoFLOXacin (LEVAQUIN) 500 MG/100ML infusion 500 mg  500 mg IntraVENous Q24H Pavan Wagner MD Stopped at 02/20/23 2138    acetaminophen (TYLENOL) tablet 650 mg  650 mg Oral Q4H PRN Salvador Agrawal MD        oxyCODONE (ROXICODONE) immediate release tablet 5 mg  5 mg Oral Q4H PRN Salvador Agrawal MD        Or   Kyle Almeida oxyCODONE HCl (OXY-IR) immediate release tablet 10 mg  10 mg Oral Q4H PRN Salvador Agrawal MD   10 mg at 02/21/23 0511    HYDROmorphone (DILAUDID) injection 0.5 mg  0.5 mg IntraVENous Q2H PRN Salvador Agrawal MD        Or   Kyle Almeida HYDROmorphone (DILAUDID) injection 1 mg  1 mg IntraVENous Q2H PRN Salvador Agrawal MD   1 mg at 02/20/23 2225    pantoprazole (PROTONIX) 40 mg in sodium chloride (PF) 0.9 % 10 mL injection  40 mg IntraVENous Daily Salvador Agrawal MD   40 mg at 02/21/23 4075    prochlorperazine (COMPAZINE) injection 10 mg  10 mg IntraVENous Q6H PRN Salvador Agrawal MD        [facility]) in sodium chloride 0.9% 50 mL IVPB SOLN 25 mg  25 mg IntraVENous Q6H PRN Salvador Agrawal MD   Stopped at 02/19/23 2323       Allergies:  No Known Allergies    Problem List:    Patient Active Problem List   Diagnosis Code    Choledocholithiasis K80.50       Past Medical History:  No past medical history on file. Past Surgical History:        Procedure Laterality Date    CHOLECYSTECTOMY, LAPAROSCOPIC N/A 2/20/2023    LAPAROSCOPIC CHOLECYSTECTOMY WITH CHOLANGIOGRAM performed by Salvador Agrawal MD at [address] [place] [address] History:    Social History     Tobacco Use    Smoking status: Never    Smokeless tobacco: Never   Substance Use Topics    Alcohol use: Not Currently                                Counseling given: Not Answered      Vital Signs (Current): There were no vitals filed for this visit.                                            BP Readings from Last 3 Encounters:   02/21/23 121/74       NPO Status:                                                                                 BMI:   Wt Readings from Last 3 Encounters:   02/21/23 172 lb 13.5 oz (78.4 kg)     There is no height or weight on file to calculate BMI.    CBC:   Lab Results   Component Value Date/Time    WBC 14.5 02/21/2023 05:00 AM    RBC 4.14 02/21/2023 05:00 AM    HGB 11.9 02/21/2023 05:00 AM    HCT 35.1 02/21/2023 05:00 AM    MCV 84.6 02/21/2023 05:00 AM    RDW 13.9 02/21/2023 05:00 AM     02/21/2023 05:00 AM       CMP:   Lab Results   Component Value Date/Time     02/21/2023 05:00 AM    K 3.9 02/21/2023 05:00 AM    K 4.2 02/20/2023 08:08 AM     02/21/2023 05:00 AM    CO2 25 02/21/2023 05:00 AM    BUN 7 02/21/2023 05:00 AM    CREATININE 0.6 02/21/2023 05:00 AM    AGRATIO 1.4 02/21/2023 05:00 AM    LABGLOM >60 02/21/2023 05:00 AM    GLUCOSE 117 02/21/2023 05:00 AM    PROT 6.0 02/21/2023 05:00 AM    CALCIUM 8.8 02/21/2023 05:00 AM    BILITOT 3.3 02/21/2023 05:00 AM    ALKPHOS 119 02/21/2023 05:00 AM     02/21/2023 05:00 AM     02/21/2023 05:00 AM       POC Tests: No results for input(s): POCGLU, POCNA, POCK, POCCL, POCBUN, POCHEMO, POCHCT in the last 72 hours. Coags: No results found for: PROTIME, INR, APTT    HCG (If Applicable): No results found for: PREGTESTUR, PREGSERUM, HCG, HCGQUANT     ABGs: No results found for: PHART, PO2ART, JRZ4KVJ, KBY7REI, BEART, Q3GLMYJW     Type & Screen (If Applicable):  No results found for: LABABO, LABRH    Drug/Infectious Status (If Applicable):  No results found for: HIV, HEPCAB    COVID-19 Screening (If Applicable): No results found for: COVID19        Anesthesia Evaluation   no history of anesthetic complications:   Airway: Mallampati: I  TM distance: >3 FB   Neck ROM: full  Comment: Prior airway:  Placement date 02/20/23; Placement time 1042; Preoxygenation Yes; Technique Direct laryngoscopy, Rapid sequence, Stylet; Type Cuffed; Tube size 7 mm; Laryngoscope Mac; Blade size 3; Location Oral; Grade view 2a; Insertion attempts 1;  Atraumatic Yes    Piercing remains in place: left nare  Mouth opening: > = 3 FB   Dental: normal exam         Pulmonary:       (-) pneumonia, asthma, shortness of breath, recent URI, sleep apnea, rhonchi, wheezes and rales                           Cardiovascular:  Exercise tolerance: good (>4 METS),       (-) hypertension, valvular problems/murmurs, past MI, CAD, CABG/stent, dysrhythmias,  angina,  GUERRERO, weak pulses, peripheral edema and no pulmonary hypertension      Rhythm: regular  Rate: normal                    Neuro/Psych:      (-) seizures, TIA and CVA           GI/Hepatic/Renal:   (+) liver disease (acute LFT elevation consistent with CBD stone):,      (-) no renal disease and no morbid obesity      ROS comment: choleliathiasis. Endo/Other:        (-) diabetes mellitus, hypothyroidism               Abdominal:         (-) obese Abdomen: tender. Vascular:     - PVD, DVT and PE. Other Findings:             Anesthesia Plan      general     ASA 1     (NPO appropriate overnight. Denies active nausea. Reports RUQ pain following cholecystectomy uneventful yesterday with Dr. Constanza Santiago.)  Induction: intravenous. MIPS: Postoperative opioids intended and Prophylactic antiemetics administered. Anesthetic plan and risks discussed with patient. Use of blood products discussed with patient whom consented to blood products. Plan discussed with CRNA. This pre-anesthesia assessment may be used as a history and physical.    DOS STAFF ADDENDUM:    Pt seen and examined, chart reviewed (including anesthesia, drug and allergy history). No interval changes to history and physical examination. Anesthetic plan, risks, benefits, alternatives, and personnel involved discussed with patient. Patient verbalized an understanding and agrees to proceed.       Tresa Summers MD  February 21, 2023  11:25 AM

## 2023-02-21 NOTE — PROGRESS NOTES
Patient is alert and orientated, is aware of NPO at midnight and agreeable. Consent was signed and placed in the chart, all questions were answered. All needs meet at this time.      Electronically signed by Nitin Wilde RN on 2/20/2023 at 7:48 PM

## 2023-02-21 NOTE — PROGRESS NOTES
Pain is not controlled with the current pain medication, patient calling out crying. No pain medications  available to be given at this time. One of the areas around her abdomen looks more swollen, that's where the pain is. A message ws left to on call Dr. Deb Mattson back from Dr Donavon Wallace, he ordered 30mg of Toradol for now.

## 2023-02-21 NOTE — PROGRESS NOTES
Patient doing well this afternoon, patient has denied the need for pain medication or nausea medication since being post-op. Patient has urinated without complications and is ambulating UAL in the room. Patient is eating and tolerating clear liquid diet at this time. Patient states all needs have been meet. Will continue with POC.      Electronically signed by Meera Schmitz RN on 2/21/2023 at 4:50 PM

## 2023-02-22 LAB
A/G RATIO: 1.4 (ref 1.1–2.2)
ALBUMIN SERPL-MCNC: 3.6 G/DL (ref 3.4–5)
ALP BLD-CCNC: 111 U/L (ref 40–129)
ALT SERPL-CCNC: 237 U/L (ref 10–40)
ANION GAP SERPL CALCULATED.3IONS-SCNC: 9 MMOL/L (ref 3–16)
AST SERPL-CCNC: 127 U/L (ref 15–37)
BILIRUB SERPL-MCNC: 1.7 MG/DL (ref 0–1)
BUN BLDV-MCNC: 6 MG/DL (ref 7–20)
CALCIUM SERPL-MCNC: 8.7 MG/DL (ref 8.3–10.6)
CHLORIDE BLD-SCNC: 105 MMOL/L (ref 99–110)
CO2: 26 MMOL/L (ref 21–32)
CREAT SERPL-MCNC: 0.5 MG/DL (ref 0.6–1.1)
GFR SERPL CREATININE-BSD FRML MDRD: >60 ML/MIN/{1.73_M2}
GLUCOSE BLD-MCNC: 106 MG/DL (ref 70–99)
HCT VFR BLD CALC: 33.3 % (ref 36–48)
HEMOGLOBIN: 11.4 G/DL (ref 12–16)
LIPASE: >3000 U/L (ref 13–60)
MCH RBC QN AUTO: 29.1 PG (ref 26–34)
MCHC RBC AUTO-ENTMCNC: 34.3 G/DL (ref 31–36)
MCV RBC AUTO: 84.9 FL (ref 80–100)
PDW BLD-RTO: 14 % (ref 12.4–15.4)
PLATELET # BLD: 265 K/UL (ref 135–450)
PMV BLD AUTO: 9.4 FL (ref 5–10.5)
POTASSIUM SERPL-SCNC: 3.7 MMOL/L (ref 3.5–5.1)
RBC # BLD: 3.92 M/UL (ref 4–5.2)
SODIUM BLD-SCNC: 140 MMOL/L (ref 136–145)
TOTAL PROTEIN: 6.1 G/DL (ref 6.4–8.2)
WBC # BLD: 13.5 K/UL (ref 4–11)

## 2023-02-22 PROCEDURE — 1200000000 HC SEMI PRIVATE

## 2023-02-22 PROCEDURE — APPSS15 APP SPLIT SHARED TIME 0-15 MINUTES: Performed by: PHYSICIAN ASSISTANT

## 2023-02-22 PROCEDURE — 6360000002 HC RX W HCPCS: Performed by: INTERNAL MEDICINE

## 2023-02-22 PROCEDURE — 85027 COMPLETE CBC AUTOMATED: CPT

## 2023-02-22 PROCEDURE — APPNB15 APP NON BILLABLE TIME 0-15 MINS: Performed by: PHYSICIAN ASSISTANT

## 2023-02-22 PROCEDURE — C9113 INJ PANTOPRAZOLE SODIUM, VIA: HCPCS | Performed by: INTERNAL MEDICINE

## 2023-02-22 PROCEDURE — 36415 COLL VENOUS BLD VENIPUNCTURE: CPT

## 2023-02-22 PROCEDURE — 2500000003 HC RX 250 WO HCPCS: Performed by: INTERNAL MEDICINE

## 2023-02-22 PROCEDURE — 2580000003 HC RX 258: Performed by: INTERNAL MEDICINE

## 2023-02-22 PROCEDURE — 6370000000 HC RX 637 (ALT 250 FOR IP): Performed by: INTERNAL MEDICINE

## 2023-02-22 PROCEDURE — 83690 ASSAY OF LIPASE: CPT

## 2023-02-22 PROCEDURE — 80053 COMPREHEN METABOLIC PANEL: CPT

## 2023-02-22 PROCEDURE — 99024 POSTOP FOLLOW-UP VISIT: CPT | Performed by: SURGERY

## 2023-02-22 RX ADMIN — HYDROMORPHONE HYDROCHLORIDE 1 MG: 1 INJECTION, SOLUTION INTRAMUSCULAR; INTRAVENOUS; SUBCUTANEOUS at 14:33

## 2023-02-22 RX ADMIN — ONDANSETRON 4 MG: 2 INJECTION INTRAMUSCULAR; INTRAVENOUS at 08:39

## 2023-02-22 RX ADMIN — HYDROMORPHONE HYDROCHLORIDE 1 MG: 1 INJECTION, SOLUTION INTRAMUSCULAR; INTRAVENOUS; SUBCUTANEOUS at 08:38

## 2023-02-22 RX ADMIN — LEVOFLOXACIN 500 MG: 5 INJECTION, SOLUTION INTRAVENOUS at 20:29

## 2023-02-22 RX ADMIN — SODIUM CHLORIDE, POTASSIUM CHLORIDE, SODIUM LACTATE AND CALCIUM CHLORIDE: 600; 310; 30; 20 INJECTION, SOLUTION INTRAVENOUS at 04:02

## 2023-02-22 RX ADMIN — PROCHLORPERAZINE EDISYLATE 10 MG: 5 INJECTION INTRAMUSCULAR; INTRAVENOUS at 20:25

## 2023-02-22 RX ADMIN — KETOROLAC TROMETHAMINE 15 MG: 15 INJECTION, SOLUTION INTRAMUSCULAR; INTRAVENOUS at 23:11

## 2023-02-22 RX ADMIN — ONDANSETRON 4 MG: 2 INJECTION INTRAMUSCULAR; INTRAVENOUS at 17:08

## 2023-02-22 RX ADMIN — METRONIDAZOLE 500 MG: 500 INJECTION, SOLUTION INTRAVENOUS at 17:14

## 2023-02-22 RX ADMIN — HYDROMORPHONE HYDROCHLORIDE 1 MG: 1 INJECTION, SOLUTION INTRAMUSCULAR; INTRAVENOUS; SUBCUTANEOUS at 03:06

## 2023-02-22 RX ADMIN — SODIUM CHLORIDE, PRESERVATIVE FREE 40 MG: 5 INJECTION INTRAVENOUS at 08:39

## 2023-02-22 RX ADMIN — SODIUM CHLORIDE, POTASSIUM CHLORIDE, SODIUM LACTATE AND CALCIUM CHLORIDE: 600; 310; 30; 20 INJECTION, SOLUTION INTRAVENOUS at 23:15

## 2023-02-22 RX ADMIN — ONDANSETRON 4 MG: 2 INJECTION INTRAMUSCULAR; INTRAVENOUS at 03:14

## 2023-02-22 RX ADMIN — Medication 25 MG: at 13:49

## 2023-02-22 RX ADMIN — OXYCODONE HYDROCHLORIDE 10 MG: 10 TABLET ORAL at 03:59

## 2023-02-22 RX ADMIN — KETOROLAC TROMETHAMINE 15 MG: 15 INJECTION, SOLUTION INTRAMUSCULAR; INTRAVENOUS at 17:08

## 2023-02-22 RX ADMIN — METRONIDAZOLE 500 MG: 500 INJECTION, SOLUTION INTRAVENOUS at 10:25

## 2023-02-22 RX ADMIN — KETOROLAC TROMETHAMINE 15 MG: 15 INJECTION, SOLUTION INTRAMUSCULAR; INTRAVENOUS at 04:48

## 2023-02-22 RX ADMIN — SODIUM CHLORIDE, PRESERVATIVE FREE 10 ML: 5 INJECTION INTRAVENOUS at 03:16

## 2023-02-22 RX ADMIN — SODIUM CHLORIDE, POTASSIUM CHLORIDE, SODIUM LACTATE AND CALCIUM CHLORIDE: 600; 310; 30; 20 INJECTION, SOLUTION INTRAVENOUS at 13:49

## 2023-02-22 RX ADMIN — METRONIDAZOLE 500 MG: 500 INJECTION, SOLUTION INTRAVENOUS at 03:15

## 2023-02-22 RX ADMIN — Medication 25 MG: at 23:13

## 2023-02-22 RX ADMIN — HYDROMORPHONE HYDROCHLORIDE 1 MG: 1 INJECTION, SOLUTION INTRAMUSCULAR; INTRAVENOUS; SUBCUTANEOUS at 20:25

## 2023-02-22 RX ADMIN — KETOROLAC TROMETHAMINE 15 MG: 15 INJECTION, SOLUTION INTRAMUSCULAR; INTRAVENOUS at 10:21

## 2023-02-22 ASSESSMENT — PAIN SCALES - GENERAL
PAINLEVEL_OUTOF10: 10
PAINLEVEL_OUTOF10: 5
PAINLEVEL_OUTOF10: 4
PAINLEVEL_OUTOF10: 4
PAINLEVEL_OUTOF10: 10
PAINLEVEL_OUTOF10: 0
PAINLEVEL_OUTOF10: 4
PAINLEVEL_OUTOF10: 7
PAINLEVEL_OUTOF10: 7
PAINLEVEL_OUTOF10: 5
PAINLEVEL_OUTOF10: 7
PAINLEVEL_OUTOF10: 0
PAINLEVEL_OUTOF10: 0

## 2023-02-22 ASSESSMENT — PAIN SCALES - WONG BAKER
WONGBAKER_NUMERICALRESPONSE: 4
WONGBAKER_NUMERICALRESPONSE: 4
WONGBAKER_NUMERICALRESPONSE: 0

## 2023-02-22 ASSESSMENT — PAIN DESCRIPTION - DESCRIPTORS
DESCRIPTORS: TIGHTNESS;PRESSURE
DESCRIPTORS: ACHING

## 2023-02-22 ASSESSMENT — PAIN - FUNCTIONAL ASSESSMENT
PAIN_FUNCTIONAL_ASSESSMENT: ACTIVITIES ARE NOT PREVENTED

## 2023-02-22 ASSESSMENT — PAIN DESCRIPTION - ORIENTATION
ORIENTATION: UPPER;MID
ORIENTATION: MID
ORIENTATION: RIGHT
ORIENTATION: MID

## 2023-02-22 ASSESSMENT — PAIN DESCRIPTION - LOCATION
LOCATION: ABDOMEN
LOCATION: ABDOMEN
LOCATION: BACK
LOCATION: ABDOMEN

## 2023-02-22 NOTE — PROGRESS NOTES
General and Vascular Surgery                                                           Daily Progress Note                                                                 Pt Name: Lazarus Loh  Medical Record Number: [id number]  Date of Birth 2000   Today's Date: 2/22/2023    Chief Complaint: abdominal pain    ASSESSMENT/PLAN  POD#2: Choledocholithiasis s/p lap win with gram    -Some increased epigastric abdominal pain overnight, better this AM  -ERCP done yesterday: Choledocolithiasis s/p biliary sphincterotomy and stone extraction with complete clearance documented by completion occlusion cholangiogram.    -LFTs improving: T:bili: 3.6 --> 3.3 -->1.7  -Pancreatitis post ERCP yesterday: Lipase: >3,000. Will  monitor for pancreatitis to improve  -NPO  -IVF  -OOB as tolerated, Ambulate  -Monitor and control pain    SUBJECTIVE  Larisa reports pain better controlled this AM.       OBJECTIVE  VITALS:   Vitals:    02/22/23 0400 02/22/23 0429 02/22/23 0541 02/22/23 0801   BP: 135/65   112/71   Pulse: 63   53   Resp: 20 20  18   Temp: 97.7 °F (36.5 °C)   98 °F (36.7 °C)   TempSrc: Oral   Oral   SpO2: 100%   97%   Weight:   173 lb 8 oz (78.7 kg)    Height:         I/O last 3 completed shifts:   In: 6224 [I.V.:3239]  Out: -   GENERAL: alert, no distress  LUNGS: clear to ausculation, without wheezes, rales or rhonci  HEART: normal rate and regular rhythm  ABDOMEN: soft, appropriately tender, wounds clean  EXTREMITY: no cyanosis, clubbing or edema      LABS  CBC:   Lab Results   Component Value Date/Time    WBC 13.5 02/22/2023 04:30 AM    RBC 3.92 02/22/2023 04:30 AM    HGB 11.4 02/22/2023 04:30 AM    HCT 33.3 02/22/2023 04:30 AM    MCV 84.9 02/22/2023 04:30 AM    MCH 29.1 02/22/2023 04:30 AM    MCHC 34.3 02/22/2023 04:30 AM    RDW 14.0 02/22/2023 04:30 AM     02/22/2023 04:30 AM    MPV 9.4 02/22/2023 04:30 AM     CMP:    Lab Results   Component Value Date/Time     02/22/2023 04:30 AM    K 3.7 02/22/2023 04:30 AM    K 4.2 02/20/2023 08:08 AM     02/22/2023 04:30 AM    CO2 26 02/22/2023 04:30 AM    BUN 6 02/22/2023 04:30 AM    CREATININE 0.5 02/22/2023 04:30 AM    AGRATIO 1.4 02/22/2023 04:30 AM    LABGLOM >60 02/22/2023 04:30 AM    GLUCOSE 106 02/22/2023 04:30 AM    PROT 6.1 02/22/2023 04:30 AM    LABALBU 3.6 02/22/2023 04:30 AM    CALCIUM 8.7 02/22/2023 04:30 AM    BILITOT 1.7 02/22/2023 04:30 AM    ALKPHOS 111 02/22/2023 04:30 AM     02/22/2023 04:30 AM     02/22/2023 04:30 AM         Roseann Gillette PA-C   Electronically signed 2/22/2023 at 11:31 AM          Surgery Staff  I have examined this patient and read and agree with the note by Liberty Bustillo PA-C from today. Post-ERCP pancreatitis.   Continue IVF, NPO, supportive care  OOB      Electronically signed by Chilo Liu MD on 2/22/2023 at 12:12 PM

## 2023-02-22 NOTE — PROGRESS NOTES
Call back from Dr Todd Del Rosario. Proper medication were ordered and administered. Checked on patient half an hour post intervention and she was fast asleep.

## 2023-02-22 NOTE — PROGRESS NOTES
Patient called out, in pain, vomited clear greenish liquid. Was given pain and nausea medication. VSS. Temp 97.7 HR 63, /65, spo2 100% on room air, RR 20.

## 2023-02-22 NOTE — PROGRESS NOTES
Patient calling more frequently for pain control since 8pm. All medication prescribed administered as ordered, nausea medication given. Patient  says she feels pressure (bubble) in between her breasts traveling all the way to her throat. Message was left for Dr. Sheila Springer.

## 2023-02-23 LAB
A/G RATIO: 1.3 (ref 1.1–2.2)
ALBUMIN SERPL-MCNC: 3.5 G/DL (ref 3.4–5)
ALP BLD-CCNC: 107 U/L (ref 40–129)
ALT SERPL-CCNC: 277 U/L (ref 10–40)
ANION GAP SERPL CALCULATED.3IONS-SCNC: 12 MMOL/L (ref 3–16)
AST SERPL-CCNC: 191 U/L (ref 15–37)
BILIRUB SERPL-MCNC: 1.5 MG/DL (ref 0–1)
BUN BLDV-MCNC: 8 MG/DL (ref 7–20)
CALCIUM SERPL-MCNC: 8.9 MG/DL (ref 8.3–10.6)
CHLORIDE BLD-SCNC: 102 MMOL/L (ref 99–110)
CO2: 24 MMOL/L (ref 21–32)
CREAT SERPL-MCNC: 0.6 MG/DL (ref 0.6–1.1)
GFR SERPL CREATININE-BSD FRML MDRD: >60 ML/MIN/{1.73_M2}
GLUCOSE BLD-MCNC: 75 MG/DL (ref 70–99)
HCT VFR BLD CALC: 33.4 % (ref 36–48)
HEMOGLOBIN: 12 G/DL (ref 12–16)
LIPASE: 568 U/L (ref 13–60)
MCH RBC QN AUTO: 30.1 PG (ref 26–34)
MCHC RBC AUTO-ENTMCNC: 36.1 G/DL (ref 31–36)
MCV RBC AUTO: 83.5 FL (ref 80–100)
PDW BLD-RTO: 13.9 % (ref 12.4–15.4)
PLATELET # BLD: 245 K/UL (ref 135–450)
PMV BLD AUTO: 9 FL (ref 5–10.5)
POTASSIUM SERPL-SCNC: 3.7 MMOL/L (ref 3.5–5.1)
RBC # BLD: 4 M/UL (ref 4–5.2)
SODIUM BLD-SCNC: 138 MMOL/L (ref 136–145)
TOTAL PROTEIN: 6.2 G/DL (ref 6.4–8.2)
WBC # BLD: 10.7 K/UL (ref 4–11)

## 2023-02-23 PROCEDURE — 99024 POSTOP FOLLOW-UP VISIT: CPT | Performed by: SURGERY

## 2023-02-23 PROCEDURE — 2580000003 HC RX 258: Performed by: INTERNAL MEDICINE

## 2023-02-23 PROCEDURE — 6370000000 HC RX 637 (ALT 250 FOR IP): Performed by: INTERNAL MEDICINE

## 2023-02-23 PROCEDURE — 2500000003 HC RX 250 WO HCPCS: Performed by: INTERNAL MEDICINE

## 2023-02-23 PROCEDURE — A4216 STERILE WATER/SALINE, 10 ML: HCPCS | Performed by: INTERNAL MEDICINE

## 2023-02-23 PROCEDURE — 6360000002 HC RX W HCPCS: Performed by: INTERNAL MEDICINE

## 2023-02-23 PROCEDURE — 6370000000 HC RX 637 (ALT 250 FOR IP): Performed by: SURGERY

## 2023-02-23 PROCEDURE — C9113 INJ PANTOPRAZOLE SODIUM, VIA: HCPCS | Performed by: INTERNAL MEDICINE

## 2023-02-23 PROCEDURE — 1200000000 HC SEMI PRIVATE

## 2023-02-23 PROCEDURE — 83690 ASSAY OF LIPASE: CPT

## 2023-02-23 PROCEDURE — APPNB15 APP NON BILLABLE TIME 0-15 MINS: Performed by: PHYSICIAN ASSISTANT

## 2023-02-23 PROCEDURE — 80053 COMPREHEN METABOLIC PANEL: CPT

## 2023-02-23 PROCEDURE — APPSS15 APP SPLIT SHARED TIME 0-15 MINUTES: Performed by: PHYSICIAN ASSISTANT

## 2023-02-23 PROCEDURE — 99024 POSTOP FOLLOW-UP VISIT: CPT | Performed by: PHYSICIAN ASSISTANT

## 2023-02-23 PROCEDURE — 85027 COMPLETE CBC AUTOMATED: CPT

## 2023-02-23 PROCEDURE — 94760 N-INVAS EAR/PLS OXIMETRY 1: CPT

## 2023-02-23 PROCEDURE — 36415 COLL VENOUS BLD VENIPUNCTURE: CPT

## 2023-02-23 RX ADMIN — LEVOFLOXACIN 500 MG: 5 INJECTION, SOLUTION INTRAVENOUS at 21:06

## 2023-02-23 RX ADMIN — DICYCLOMINE HYDROCHLORIDE 10 MG: 10 CAPSULE ORAL at 17:51

## 2023-02-23 RX ADMIN — METRONIDAZOLE 500 MG: 500 INJECTION, SOLUTION INTRAVENOUS at 09:55

## 2023-02-23 RX ADMIN — KETOROLAC TROMETHAMINE 15 MG: 15 INJECTION, SOLUTION INTRAMUSCULAR; INTRAVENOUS at 05:32

## 2023-02-23 RX ADMIN — OXYCODONE 5 MG: 5 TABLET ORAL at 23:11

## 2023-02-23 RX ADMIN — METRONIDAZOLE 500 MG: 500 INJECTION, SOLUTION INTRAVENOUS at 17:52

## 2023-02-23 RX ADMIN — DICYCLOMINE HYDROCHLORIDE 10 MG: 10 CAPSULE ORAL at 23:11

## 2023-02-23 RX ADMIN — ONDANSETRON 4 MG: 2 INJECTION INTRAMUSCULAR; INTRAVENOUS at 22:22

## 2023-02-23 RX ADMIN — DICYCLOMINE HYDROCHLORIDE 10 MG: 10 CAPSULE ORAL at 11:48

## 2023-02-23 RX ADMIN — SODIUM CHLORIDE, PRESERVATIVE FREE 40 MG: 5 INJECTION INTRAVENOUS at 09:55

## 2023-02-23 RX ADMIN — METRONIDAZOLE 500 MG: 500 INJECTION, SOLUTION INTRAVENOUS at 01:44

## 2023-02-23 ASSESSMENT — PAIN DESCRIPTION - PAIN TYPE: TYPE: SURGICAL PAIN

## 2023-02-23 ASSESSMENT — PAIN DESCRIPTION - LOCATION
LOCATION: BACK
LOCATION: BACK

## 2023-02-23 ASSESSMENT — PAIN DESCRIPTION - ORIENTATION
ORIENTATION: RIGHT
ORIENTATION: RIGHT

## 2023-02-23 ASSESSMENT — PAIN DESCRIPTION - ONSET: ONSET: ON-GOING

## 2023-02-23 ASSESSMENT — PAIN SCALES - GENERAL
PAINLEVEL_OUTOF10: 6
PAINLEVEL_OUTOF10: 0
PAINLEVEL_OUTOF10: 4
PAINLEVEL_OUTOF10: 0
PAINLEVEL_OUTOF10: 4
PAINLEVEL_OUTOF10: 0
PAINLEVEL_OUTOF10: 0

## 2023-02-23 ASSESSMENT — PAIN DESCRIPTION - FREQUENCY: FREQUENCY: INTERMITTENT

## 2023-02-23 ASSESSMENT — PAIN DESCRIPTION - DESCRIPTORS
DESCRIPTORS: ACHING
DESCRIPTORS: ACHING

## 2023-02-23 ASSESSMENT — PAIN - FUNCTIONAL ASSESSMENT
PAIN_FUNCTIONAL_ASSESSMENT: ACTIVITIES ARE NOT PREVENTED
PAIN_FUNCTIONAL_ASSESSMENT: PREVENTS OR INTERFERES SOME ACTIVE ACTIVITIES AND ADLS

## 2023-02-23 ASSESSMENT — PAIN SCALES - WONG BAKER
WONGBAKER_NUMERICALRESPONSE: 0
WONGBAKER_NUMERICALRESPONSE: 2
WONGBAKER_NUMERICALRESPONSE: 6
WONGBAKER_NUMERICALRESPONSE: 0

## 2023-02-23 NOTE — PLAN OF CARE
Problem: Discharge Planning  Goal: Discharge to home or other facility with appropriate resources  Outcome: Progressing  Flowsheets (Taken 2/22/2023 0900 by Lucy Romero, RN)  Discharge to home or other facility with appropriate resources:   Identify barriers to discharge with patient and caregiver   Arrange for needed discharge resources and transportation as appropriate   Identify discharge learning needs (meds, wound care, etc)   Arrange for interpreters to assist at discharge as needed   Refer to discharge planning if patient needs post-hospital services based on physician order or complex needs related to functional status, cognitive ability or social support system     Problem: Pain  Goal: Verbalizes/displays adequate comfort level or baseline comfort level  2/23/2023 0806 by Johnathan Brown RN  Outcome: Progressing  Flowsheets (Taken 2/23/2023 0806)  Verbalizes/displays adequate comfort level or baseline comfort level: Encourage patient to monitor pain and request assistance     Problem: ABCDS Injury Assessment  Goal: Absence of physical injury  Outcome: Progressing  Flowsheets (Taken 2/22/2023 9180 by Lucy Romero, RN)  Absence of Physical Injury: Implement safety measures based on patient assessment     Problem: Nutrition Deficit:  Goal: Optimize nutritional status  Outcome: Progressing  Flowsheets (Taken 2/23/2023 0806)  Nutrient intake appropriate for improving, restoring, or maintaining nutritional needs: Assess nutritional status and recommend course of action     Problem: Safety - Adult  Goal: Free from fall injury  Outcome: Progressing  Flowsheets (Taken 2/23/2023 0806)  Free From Fall Injury: Instruct family/caregiver on patient safety

## 2023-02-23 NOTE — PROGRESS NOTES
General and Vascular Surgery                                                           Daily Progress Note                                                                 Pt Name: Armin Dickerson  Medical Record Number: [id number]  Date of Birth 2000   Today's Date: 2/23/2023    Chief Complaint: abdominal pain    ASSESSMENT/PLAN  POD#3: Choledocholithiasis s/p lap win with gram    -abdominal pain improved today. Incision sites ok  -ERCP done yesterday: Choledocolithiasis s/p biliary sphincterotomy and stone extraction with complete clearance documented by completion occlusion cholangiogram.    -T:bili: 3.6 --> 3.3 -->1.7-->1.5  -Pancreatitis post ERCP (2/21/23): Lipase improved: >3,000--> 568  -clear liquid diet  -IVF  -OOB as tolerated, Ambulate. OK to shower  -Monitor and control pain    SUBJECTIVE  Larisa reports pain better controlled this AM.     OBJECTIVE  VITALS:   Vitals:    02/22/23 2055 02/23/23 0437 02/23/23 0824 02/23/23 0840   BP:    127/72   Pulse:    76   Resp: 18  18 18   Temp:    97.7 °F (36.5 °C)   TempSrc:    Oral   SpO2:   96%    Weight:  173 lb 15.1 oz (78.9 kg)     Height:         I/O last 3 completed shifts:   In: 3578 [I.V.:4079]  Out: -   GENERAL: alert, no distress  LUNGS: clear to ausculation, without wheezes, rales or rhonci  HEART: normal rate and regular rhythm  ABDOMEN: soft, appropriately tender, wounds clean  EXTREMITY: no cyanosis, clubbing or edema      LABS  CBC:   Lab Results   Component Value Date/Time    WBC 10.7 02/23/2023 05:01 AM    RBC 4.00 02/23/2023 05:01 AM    HGB 12.0 02/23/2023 05:01 AM    HCT 33.4 02/23/2023 05:01 AM    MCV 83.5 02/23/2023 05:01 AM    MCH 30.1 02/23/2023 05:01 AM    MCHC 36.1 02/23/2023 05:01 AM    RDW 13.9 02/23/2023 05:01 AM     02/23/2023 05:01 AM    MPV 9.0 02/23/2023 05:01 AM     CMP:    Lab Results   Component Value Date/Time     02/23/2023 05:01 AM    K 3.7 02/23/2023 05:01 AM    K 4.2 02/20/2023 08:08 AM     02/23/2023 05:01 AM    CO2 24 02/23/2023 05:01 AM    BUN 8 02/23/2023 05:01 AM    CREATININE 0.6 02/23/2023 05:01 AM    AGRATIO 1.3 02/23/2023 05:01 AM    LABGLOM >60 02/23/2023 05:01 AM    GLUCOSE 75 02/23/2023 05:01 AM    PROT 6.2 02/23/2023 05:01 AM    LABALBU 3.5 02/23/2023 05:01 AM    CALCIUM 8.9 02/23/2023 05:01 AM    BILITOT 1.5 02/23/2023 05:01 AM    ALKPHOS 107 02/23/2023 05:01 AM     02/23/2023 05:01 AM     02/23/2023 05:01 AM         Yolanda Reagan PA-C   Electronically signed 2/23/2023 at 10:17 AM        Surgery Staff  I have examined this patient and read and agree with the note by Zelalem Duenas PA-C from today. Feels much better today. Nausea resolved  Lipase trending down    Tolerating clears.   Advance diet as tolerated  Home in next day or 2 as she tolerates PO      Electronically signed by Yahaira Brooks MD on 2/23/2023 at 12:51 PM

## 2023-02-23 NOTE — PROGRESS NOTES
Pt in bed, resting, IV fluids infusing, significant other at bedside. Pt seems a little uncomfortable. Plan to keep up with her pain and nausea regimen as prescribed.

## 2023-02-23 NOTE — PROGRESS NOTES
Patient is alert & oriented x4, ual no weakness noted, 2/4 bed rails up, bed in lowest position, fall precautions in place, call light within reach. Morning assessment complete. Morning medications given. Pt attempting to eat a lemon ice and drinking water. No complaints of pain at this time. Will continue to monitor and reassess.   Electronically signed by Hever Escobar RN on 2/23/2023 at 10:03 AM

## 2023-02-23 NOTE — PROGRESS NOTES
INPATIENT PROGRESS NOTE        IDENTIFYING DATA/REASON FOR CONSULTATION   PATIENT:  Randy Funes  MRN:  [id number]  ADMIT DATE: 2023  TIME OF EVALUATION: 2023 5:54 AM  HOSPITAL STAY:   LOS: 4 days   CONSULTING PHYSICIAN: Bernadette Chaudhari MD   REASON FOR CONSULTATION:    Subjective:    Patient seen in follow up   She has some nausea and vomiting x1 last night. Did not tolerate ice chips and sips of water  Feeling better this morning. Rates pain a 3 out of 10  Passing flatus,  no BM    MEDICATIONS   SCHEDULED:  dicyclomine, 10 mg, 4 times per day  sodium chloride flush, 5-40 mL, 2 times per day  enoxaparin, 40 mg, Daily  metroNIDAZOLE, 500 mg, Q8H  levofloxacin, 500 mg, Q24H  pantoprazole (PROTONIX) 40 mg injection, 40 mg, Daily    FLUIDS/DRIPS:     sodium chloride      lactated ringers IV soln 125 mL/hr at 23 2315     PRNs: sodium chloride flush, 5-40 mL, PRN  sodium chloride, , PRN  ondansetron, 4 mg, Q8H PRN   Or  ondansetron, 4 mg, Q6H PRN  potassium chloride, 10 mEq, PRN  magnesium sulfate, 2,000 mg, PRN  acetaminophen, 650 mg, Q4H PRN  oxyCODONE, 5 mg, Q4H PRN   Or  oxyCODONE, 10 mg, Q4H PRN  HYDROmorphone, 0.5 mg, Q2H PRN   Or  HYDROmorphone, 1 mg, Q2H PRN  prochlorperazine, 10 mg, Q6H PRN  promethazine, 25 mg, Q6H PRN    ALLERGIES:  No Known Allergies      PHYSICAL EXAM   VITALS:  /64   Pulse 63   Temp 98.3 °F (36.8 °C) (Oral)   Resp 18   Ht 5' 10\" (1.778 m)   Wt 173 lb 15.1 oz (78.9 kg)   SpO2 97%   BMI 24.96 kg/m²   TEMPERATURE:  Current - Temp: 98.3 °F (36.8 °C); Max - Temp  Av.2 °F (36.8 °C)  Min: 98 °F (36.7 °C)  Max: 98.3 °F (36.8 °C)    Physical Exam:  General appearance: alert, cooperative, no distress, appears stated age  Eyes: Anicteric  Head: Normocephalic, without obvious abnormality  Lungs: clear to auscultation bilaterally, Normal Effort  Heart: regular rate and rhythm, normal S1 and S2, no murmurs or rubs  Abdomen: Epigastric tenderness. soft, non-distended. Bowel sounds normal.   Extremities: atraumatic, no cyanosis or edema  Skin: warm and dry, no jaundice  Neuro: Grossly intact, A&OX3    LABS AND IMAGING   Laboratory   Recent Labs     02/21/23  0500 02/22/23  0430 02/23/23  0501   WBC 14.5* 13.5* 10.7   HGB 11.9* 11.4* 12.0   HCT 35.1* 33.3* 33.4*   MCV 84.6 84.9 83.5    265 245     Recent Labs     02/20/23  0808 02/21/23  0500 02/22/23  0430    138 140   K 4.2 3.9 3.7    104 105   CO2 25 25 26   BUN 6* 7 6*   CREATININE 0.7 0.6 0.5*     Recent Labs     02/20/23  0808 02/21/23  0500 02/22/23  0430   * 131* 127*   * 242* 237*   BILIDIR 2.5*  --   --    BILITOT 3.6* 3.3* 1.7*   ALKPHOS 126 119 111     Recent Labs     02/22/23  0430   LIPASE >3,000.0*     No results for input(s): PROTIME, INR in the last 72 hours. Imaging  FL ERCP BILIARY AND PANCREATIC S&I   Final Result   ERCP images as above. Please refer to the procedure report for further   details. FL CHOLANGIOGRAM OR   Preliminary Result   Findings consistent with presence of choledocholithiasis as described above. CT ABDOMEN PELVIS W IV CONTRAST Additional Contrast? None   Final Result   Intrahepatic and extrahepatic biliary ductal dilatation with underlying   cholelithiasis. Correlation with liver function test is recommended, which   will dictate the need for additional imaging such as MRI/MRCP or endoscopic   examination. T-shaped contraceptive device appears to be abnormally located and the left   arm appears to be overlying the superior wall of the uterine   myometrium/serosa, which may reflect partial perforation. Recommend   gynecological consultation. Apparent diffuse urinary bladder wall thickening, which may be due to under   distention. Correlate clinically with signs of cystitis.              Endoscopy      ASSESSMENT AND RECOMMENDATIONS   Neil Dooley is a 25 y.o. female with no significant PMH  who presents with Choledocolithiasis s/p biliary sphincterotomy and stone extraction on 2/21/23. Post ERCP pancreatitis  -Endorses post procedure epigastric pain radiates around to the back with nausea and bilary vomit. Found to have lipase > 3000 (2/22)  -started on LR @125 ml/hr  -clinically improving this morning with less pain. -will trial clear liquid diet      Choledocholithiasis   -s/p ERCP with biliary sphincterotomy and stone extraction with complete clearance documented by completion occlusion cholangiogram.  -LFTs improving. Bili 3.3->1.7->1.5    Symptomatic Cholelithiasis  -s/p cholecystectomy          RECOMMENDATIONS:    Trial clear liquid diet. Monitor tolerance  Continue LR @ 125 ml/hr  Continue Analgesics/antiemetics as needed    If you have any questions or need any further information, please feel free to contact us [phone]. Thank you for allowing us to participate in the care of Ursula Mattson. The note was completed using Dragon voice recognition transcription. Every effort was made to ensure accuracy; however, inadvertent transcription errors may be present despite my best efforts to edit errors.       Uma GUILLEN

## 2023-02-23 NOTE — PROGRESS NOTES
Pt tolerating clears well. Diet upgraded to full liquid diet for dinner. Mom is at bedside. Pt not complaining of any current pain or nausea.    Electronically signed by Mj Kang RN on 2/23/2023 at 2:24 PM

## 2023-02-24 VITALS
HEART RATE: 50 BPM | HEIGHT: 70 IN | TEMPERATURE: 98.2 F | DIASTOLIC BLOOD PRESSURE: 83 MMHG | BODY MASS INDEX: 24.37 KG/M2 | RESPIRATION RATE: 16 BRPM | WEIGHT: 170.19 LBS | SYSTOLIC BLOOD PRESSURE: 124 MMHG | OXYGEN SATURATION: 98 %

## 2023-02-24 LAB
A/G RATIO: 1.4 (ref 1.1–2.2)
ALBUMIN SERPL-MCNC: 3.4 G/DL (ref 3.4–5)
ALP BLD-CCNC: 91 U/L (ref 40–129)
ALT SERPL-CCNC: 218 U/L (ref 10–40)
ANION GAP SERPL CALCULATED.3IONS-SCNC: 10 MMOL/L (ref 3–16)
AST SERPL-CCNC: 140 U/L (ref 15–37)
BILIRUB SERPL-MCNC: 1.1 MG/DL (ref 0–1)
BUN BLDV-MCNC: 6 MG/DL (ref 7–20)
CALCIUM SERPL-MCNC: 8.8 MG/DL (ref 8.3–10.6)
CHLORIDE BLD-SCNC: 104 MMOL/L (ref 99–110)
CO2: 26 MMOL/L (ref 21–32)
CREAT SERPL-MCNC: 0.6 MG/DL (ref 0.6–1.1)
GFR SERPL CREATININE-BSD FRML MDRD: >60 ML/MIN/{1.73_M2}
GLUCOSE BLD-MCNC: 89 MG/DL (ref 70–99)
HCT VFR BLD CALC: 31 % (ref 36–48)
HEMOGLOBIN: 10.8 G/DL (ref 12–16)
LIPASE: 67 U/L (ref 13–60)
MCH RBC QN AUTO: 29 PG (ref 26–34)
MCHC RBC AUTO-ENTMCNC: 34.9 G/DL (ref 31–36)
MCV RBC AUTO: 83.2 FL (ref 80–100)
PDW BLD-RTO: 13.7 % (ref 12.4–15.4)
PLATELET # BLD: 233 K/UL (ref 135–450)
PMV BLD AUTO: 9 FL (ref 5–10.5)
POTASSIUM SERPL-SCNC: 3.6 MMOL/L (ref 3.5–5.1)
RBC # BLD: 3.72 M/UL (ref 4–5.2)
SODIUM BLD-SCNC: 140 MMOL/L (ref 136–145)
TOTAL PROTEIN: 5.8 G/DL (ref 6.4–8.2)
WBC # BLD: 7.9 K/UL (ref 4–11)

## 2023-02-24 PROCEDURE — 6360000002 HC RX W HCPCS: Performed by: INTERNAL MEDICINE

## 2023-02-24 PROCEDURE — 99024 POSTOP FOLLOW-UP VISIT: CPT | Performed by: SURGERY

## 2023-02-24 PROCEDURE — 2500000003 HC RX 250 WO HCPCS: Performed by: INTERNAL MEDICINE

## 2023-02-24 PROCEDURE — A4216 STERILE WATER/SALINE, 10 ML: HCPCS | Performed by: INTERNAL MEDICINE

## 2023-02-24 PROCEDURE — APPSS15 APP SPLIT SHARED TIME 0-15 MINUTES: Performed by: PHYSICIAN ASSISTANT

## 2023-02-24 PROCEDURE — 85027 COMPLETE CBC AUTOMATED: CPT

## 2023-02-24 PROCEDURE — 6370000000 HC RX 637 (ALT 250 FOR IP): Performed by: INTERNAL MEDICINE

## 2023-02-24 PROCEDURE — 80053 COMPREHEN METABOLIC PANEL: CPT

## 2023-02-24 PROCEDURE — 36415 COLL VENOUS BLD VENIPUNCTURE: CPT

## 2023-02-24 PROCEDURE — 6370000000 HC RX 637 (ALT 250 FOR IP): Performed by: SURGERY

## 2023-02-24 PROCEDURE — 94760 N-INVAS EAR/PLS OXIMETRY 1: CPT

## 2023-02-24 PROCEDURE — APPNB15 APP NON BILLABLE TIME 0-15 MINS: Performed by: PHYSICIAN ASSISTANT

## 2023-02-24 PROCEDURE — 83690 ASSAY OF LIPASE: CPT

## 2023-02-24 PROCEDURE — C9113 INJ PANTOPRAZOLE SODIUM, VIA: HCPCS | Performed by: INTERNAL MEDICINE

## 2023-02-24 PROCEDURE — 99024 POSTOP FOLLOW-UP VISIT: CPT | Performed by: PHYSICIAN ASSISTANT

## 2023-02-24 PROCEDURE — 2580000003 HC RX 258: Performed by: INTERNAL MEDICINE

## 2023-02-24 RX ORDER — OXYCODONE HYDROCHLORIDE 5 MG/1
5 TABLET ORAL EVERY 6 HOURS PRN
Qty: 15 TABLET | Refills: 0 | Status: SHIPPED | OUTPATIENT
Start: 2023-02-24 | End: 2023-03-03

## 2023-02-24 RX ORDER — ONDANSETRON 4 MG/1
4 TABLET, ORALLY DISINTEGRATING ORAL EVERY 8 HOURS PRN
Qty: 15 TABLET | Refills: 0 | Status: SHIPPED | OUTPATIENT
Start: 2023-02-24

## 2023-02-24 RX ADMIN — ONDANSETRON 4 MG: 2 INJECTION INTRAMUSCULAR; INTRAVENOUS at 05:30

## 2023-02-24 RX ADMIN — SODIUM CHLORIDE, POTASSIUM CHLORIDE, SODIUM LACTATE AND CALCIUM CHLORIDE: 600; 310; 30; 20 INJECTION, SOLUTION INTRAVENOUS at 00:36

## 2023-02-24 RX ADMIN — METRONIDAZOLE 500 MG: 500 INJECTION, SOLUTION INTRAVENOUS at 09:36

## 2023-02-24 RX ADMIN — DICYCLOMINE HYDROCHLORIDE 10 MG: 10 CAPSULE ORAL at 05:29

## 2023-02-24 RX ADMIN — SODIUM CHLORIDE, PRESERVATIVE FREE 40 MG: 5 INJECTION INTRAVENOUS at 08:21

## 2023-02-24 RX ADMIN — ENOXAPARIN SODIUM 40 MG: 100 INJECTION SUBCUTANEOUS at 08:12

## 2023-02-24 RX ADMIN — SODIUM CHLORIDE, PRESERVATIVE FREE 5 ML: 5 INJECTION INTRAVENOUS at 10:56

## 2023-02-24 RX ADMIN — OXYCODONE 5 MG: 5 TABLET ORAL at 05:29

## 2023-02-24 RX ADMIN — METRONIDAZOLE 500 MG: 500 INJECTION, SOLUTION INTRAVENOUS at 02:03

## 2023-02-24 RX ADMIN — SODIUM CHLORIDE, POTASSIUM CHLORIDE, SODIUM LACTATE AND CALCIUM CHLORIDE: 600; 310; 30; 20 INJECTION, SOLUTION INTRAVENOUS at 09:44

## 2023-02-24 RX ADMIN — DICYCLOMINE HYDROCHLORIDE 10 MG: 10 CAPSULE ORAL at 11:24

## 2023-02-24 ASSESSMENT — PAIN SCALES - GENERAL
PAINLEVEL_OUTOF10: 6
PAINLEVEL_OUTOF10: 3
PAINLEVEL_OUTOF10: 3

## 2023-02-24 ASSESSMENT — PAIN DESCRIPTION - ONSET
ONSET: PROGRESSIVE
ONSET: GRADUAL

## 2023-02-24 ASSESSMENT — PAIN DESCRIPTION - FREQUENCY
FREQUENCY: INTERMITTENT
FREQUENCY: INTERMITTENT

## 2023-02-24 ASSESSMENT — PAIN DESCRIPTION - PAIN TYPE
TYPE: SURGICAL PAIN;ACUTE PAIN
TYPE: SURGICAL PAIN;ACUTE PAIN

## 2023-02-24 ASSESSMENT — PAIN DESCRIPTION - LOCATION
LOCATION: BACK

## 2023-02-24 ASSESSMENT — PAIN DESCRIPTION - DESCRIPTORS
DESCRIPTORS: ACHING

## 2023-02-24 ASSESSMENT — PAIN DESCRIPTION - ORIENTATION
ORIENTATION: MID;LEFT
ORIENTATION: MID
ORIENTATION: MID;LEFT

## 2023-02-24 ASSESSMENT — PAIN SCALES - WONG BAKER
WONGBAKER_NUMERICALRESPONSE: 0

## 2023-02-24 ASSESSMENT — PAIN - FUNCTIONAL ASSESSMENT
PAIN_FUNCTIONAL_ASSESSMENT: PREVENTS OR INTERFERES SOME ACTIVE ACTIVITIES AND ADLS
PAIN_FUNCTIONAL_ASSESSMENT: PREVENTS OR INTERFERES SOME ACTIVE ACTIVITIES AND ADLS

## 2023-02-24 NOTE — PROGRESS NOTES
Patient in bed resting. Mom at bedside. Patient alert and oriented x4. Patient stated pain 3/10 pain in back but did not want anything pain medication yet. Patient repositioned in bed. Patient morning meds passed. Call light in reach. Will continue to monitor.    Electronically signed by Charley Tipton RN on 2/24/2023 at 8:42 AM

## 2023-02-24 NOTE — PROGRESS NOTES
Patient discharged with belongings and durable medical equipment with family via wheelchair via [facility] transportation to home. Patient given discharge instructions, patient verbalized understanding, no questions at this time. Prescriptions given to patient/family. IV D/Cd patient tolerated well, no complications.        Electronically signed by Shelley Montero RN on 2/24/2023 at 2:26 PM

## 2023-02-24 NOTE — PROGRESS NOTES
INPATIENT PROGRESS NOTE        IDENTIFYING DATA/REASON FOR CONSULTATION   PATIENT:  Jonathan Goyal  MRN:  [id number]  ADMIT DATE: 2023  TIME OF EVALUATION: 2023 9:22 AM  HOSPITAL STAY:   LOS: 5 days   CONSULTING PHYSICIAN: Tyesha Madera MD   REASON FOR CONSULTATION:    Subjective:    Patient seen in follow up   She attempted to eat some food from her full liquid diet yesterday. She had grits yesterday late afternoon and later that even began to feel very nauseous and had abdominal pain and back throughout the night. She reports this morning she is feeling better but not ready yet to try eating again. She rates her pain this morning a 4 out of 10    MEDICATIONS   SCHEDULED:  dicyclomine, 10 mg, 4 times per day  sodium chloride flush, 5-40 mL, 2 times per day  enoxaparin, 40 mg, Daily  metroNIDAZOLE, 500 mg, Q8H  levofloxacin, 500 mg, Q24H  pantoprazole (PROTONIX) 40 mg injection, 40 mg, Daily    FLUIDS/DRIPS:     sodium chloride      lactated ringers IV soln 125 mL/hr at 23 0036     PRNs: sodium chloride flush, 5-40 mL, PRN  sodium chloride, , PRN  ondansetron, 4 mg, Q8H PRN   Or  ondansetron, 4 mg, Q6H PRN  potassium chloride, 10 mEq, PRN  magnesium sulfate, 2,000 mg, PRN  acetaminophen, 650 mg, Q4H PRN  oxyCODONE, 5 mg, Q4H PRN   Or  oxyCODONE, 10 mg, Q4H PRN  HYDROmorphone, 0.5 mg, Q2H PRN   Or  HYDROmorphone, 1 mg, Q2H PRN  prochlorperazine, 10 mg, Q6H PRN  promethazine, 25 mg, Q6H PRN    ALLERGIES:  No Known Allergies      PHYSICAL EXAM   VITALS:  /83   Pulse 50   Temp 98.2 °F (36.8 °C) (Oral)   Resp 16   Ht 5' 10\" (1.778 m)   Wt 170 lb 3.1 oz (77.2 kg)   SpO2 97%   BMI 24.42 kg/m²   TEMPERATURE:  Current - Temp: 98.2 °F (36.8 °C);  Max - Temp  Av.3 °F (36.8 °C)  Min: 98 °F (36.7 °C)  Max: 98.6 °F (37 °C)    Physical Exam:  General appearance: alert, cooperative, no distress, appears stated age  Eyes: Anicteric  Head: Normocephalic, without obvious abnormality  Lungs: clear to auscultation bilaterally, Normal Effort  Heart: regular rate and rhythm, normal S1 and S2, no murmurs or rubs  Abdomen: Epigastric tenderness. soft, non-distended.  Bowel sounds normal.   Extremities: atraumatic, no cyanosis or edema  Skin: warm and dry, no jaundice  Neuro: Grossly intact, A&OX3    LABS AND IMAGING   Laboratory   Recent Labs     02/22/23  0430 02/23/23  0501 02/24/23  0833   WBC 13.5* 10.7 7.9   HGB 11.4* 12.0 10.8*   HCT 33.3* 33.4* 31.0*   MCV 84.9 83.5 83.2    245 233     Recent Labs     02/22/23  0430 02/23/23  0501 02/24/23  0834    138 140   K 3.7 3.7 3.6    102 104   CO2 26 24 26   BUN 6* 8 6*   CREATININE 0.5* 0.6 0.6     Recent Labs     02/22/23  0430 02/23/23  0501 02/24/23  0834   * 191* 140*   * 277* 218*   BILITOT 1.7* 1.5* 1.1*   ALKPHOS 111 107 91     Recent Labs     02/22/23  0430 02/23/23  0501 02/24/23  0834   LIPASE >3,000.0* 568.0* 67.0*     No results for input(s): PROTIME, INR in the last 72 hours.      Imaging  FL ERCP BILIARY AND PANCREATIC S&I   Final Result   ERCP images as above.  Please refer to the procedure report for further   details.         FL CHOLANGIOGRAM OR   Final Result   Findings consistent with presence of choledocholithiasis as described above.         CT ABDOMEN PELVIS W IV CONTRAST Additional Contrast? None   Final Result   Intrahepatic and extrahepatic biliary ductal dilatation with underlying   cholelithiasis.  Correlation with liver function test is recommended, which   will dictate the need for additional imaging such as MRI/MRCP or endoscopic   examination.      T-shaped contraceptive device appears to be abnormally located and the left   arm appears to be overlying the superior wall of the uterine   myometrium/serosa, which may reflect partial perforation.  Recommend   gynecological consultation.      Apparent diffuse urinary bladder wall thickening, which may be due to under   distention.  Correlate clinically with  signs of cystitis. Endoscopy      ASSESSMENT AND RECOMMENDATIONS   Francie Fleischer is a 25 y.o. female with no significant PMH  who presents with Choledocolithiasis s/p biliary sphincterotomy and stone extraction on 2/21/23. Post ERCP pancreatitis with epigastric pain radiating to back, nausea, vomiting and lipase > 3000 (2/22). She was placed on LR @125 ml/hr. She had some ongoing nausea and pain overnight. Feeling a little better this morning. Choledocholithiasis s/p ERCP with biliary sphincterotomy and stone extraction with complete clearance documented by completion occlusion cholangiogram. LFTs improving. Bili 3.3->1.7->1.5->1. 1    Symptomatic Cholelithiasis s/p cholecystectomy          RECOMMENDATIONS:    Diet as tolerated. Will monitor  Continue LR @ 125 ml/hr  Continue Analgesics/antiemetics as needed    If you have any questions or need any further information, please feel free to contact us [phone]. Thank you for allowing us to participate in the care of Francie Fleischer. The note was completed using Dragon voice recognition transcription. Every effort was made to ensure accuracy; however, inadvertent transcription errors may be present despite my best efforts to edit errors.       Uma GUILLEN

## 2023-02-24 NOTE — PROGRESS NOTES
Comprehensive Nutrition Assessment    Type and Reason for Visit:  Reassess    Nutrition Recommendations/Plan:   Monitor tolerance to Low Fat Diet  Add Ensure Enlive tid (pt requesting strawberry at this time)     Malnutrition Assessment:  Malnutrition Status: At risk for malnutrition (Comment) (02/20/23 1607)    Context:            Nutrition Assessment:    Follow-up. Pt s/p cholecystectomy and ERCP. Diet slowly adv to Low Fat. Pt reported some continued nausea post op. Agreed to adding Ensure Enlive tid to start. Encouraged nutrient dense choices to help ensure adequacy. Pt agreed to try. Nutrition Related Findings:    Noted BM on 2/18. Labs reviewed. Noted no edema. Wound Type: Surgical Incision (no issues noted to lap site)       Current Nutrition Intake & Therapies:    Average Meal Intake: 1-25%  Average Supplements Intake: NPO  ADULT DIET; Regular; Low Fat (less than or equal to 50 gm/day)  ADULT ORAL NUTRITION SUPPLEMENT; Breakfast, Lunch, Dinner; Standard High Calorie/High Protein Oral Supplement    Anthropometric Measures:  Height: 5' 10\" (177.8 cm)  Ideal Body Weight (IBW): 150 lbs (68 kg)    Admission Body Weight: 168 lb (76.2 kg)  Current Body Weight: 170 lb (77.1 kg),   IBW. Weight Source: Bed Scale  Current BMI (kg/m2): 24.4                          BMI Categories: Normal Weight (BMI 18.5-24. 9)    Estimated Daily Nutrient Needs:  Energy Requirements Based On: Kcal/kg  Weight Used for Energy Requirements: Current  Energy (kcal/day): 3920-0220 (25-30 kcal/76.2 kg)  Weight Used for Protein Requirements: Current  Protein (g/day): 91-99 (1.2-1.3 g/76.2 kg)  Method Used for Fluid Requirements: 1 ml/kcal  Fluid (ml/day):      Nutrition Diagnosis:   Inadequate protein-energy intake related to altered GI function, pain as evidenced by intake 0-25%    Nutrition Interventions:   Food and/or Nutrient Delivery: Continue Current Diet, Start Oral Nutrition Supplement  Nutrition Education/Counseling: No recommendation at this time  Coordination of Nutrition Care: Continue to monitor while inpatient       Goals:     Goals: PO intake 50% or greater       Nutrition Monitoring and Evaluation:   Behavioral-Environmental Outcomes: None Identified  Food/Nutrient Intake Outcomes: Food and Nutrient Intake, Supplement Intake  Physical Signs/Symptoms Outcomes: Biochemical Data, GI Status, Nausea or Vomiting, Fluid Status or Edema, Weight, Skin, Diarrhea, Constipation    Discharge Planning:     Too soon to determine     Vasquez Cruz, [address] [place] [address],   Contact: [phone]

## 2023-02-24 NOTE — PLAN OF CARE
Problem: Discharge Planning  Goal: Discharge to home or other facility with appropriate resources  Outcome: Progressing  Flowsheets (Taken 2/24/2023 0003)  Discharge to home or other facility with appropriate resources:   Identify barriers to discharge with patient and caregiver   Arrange for needed discharge resources and transportation as appropriate   Identify discharge learning needs (meds, wound care, etc)   Refer to discharge planning if patient needs post-hospital services based on physician order or complex needs related to functional status, cognitive ability or social support system     Problem: Pain  Goal: Verbalizes/displays adequate comfort level or baseline comfort level  Outcome: Progressing  Flowsheets (Taken 2/24/2023 0003)  Verbalizes/displays adequate comfort level or baseline comfort level:   Encourage patient to monitor pain and request assistance   Administer analgesics based on type and severity of pain and evaluate response   Consider cultural and social influences on pain and pain management   Assess pain using appropriate pain scale   Implement non-pharmacological measures as appropriate and evaluate response     Problem: ABCDS Injury Assessment  Goal: Absence of physical injury  Outcome: Progressing  Flowsheets (Taken 2/24/2023 0003)  Absence of Physical Injury: Implement safety measures based on patient assessment     Problem: Nutrition Deficit:  Goal: Optimize nutritional status  Outcome: Progressing  Flowsheets (Taken 2/24/2023 0003)  Nutrient intake appropriate for improving, restoring, or maintaining nutritional needs:   Assess nutritional status and recommend course of action   Monitor oral intake, labs, and treatment plans   Recommend appropriate diets, oral nutritional supplements, and vitamin/mineral supplements   Provide specific nutrition education to patient or family as appropriate     Problem: Safety - Adult  Goal: Free from fall injury  Outcome: Progressing  Flowsheets (Taken 2/24/2023 0003)  Free From Fall Injury:   Instruct family/caregiver on patient safety   Based on caregiver fall risk screen, instruct family/caregiver to ask for assistance with transferring infant if caregiver noted to have fall risk factors

## 2023-02-24 NOTE — PLAN OF CARE
Problem: Discharge Planning  Goal: Discharge to home or other facility with appropriate resources  2/24/2023 1351 by Rm Alexis RN  Outcome: Completed  2/24/2023 0003 by Willie Raygoza  Outcome: Progressing  Flowsheets (Taken 2/24/2023 0003)  Discharge to home or other facility with appropriate resources:   Identify barriers to discharge with patient and caregiver   Arrange for needed discharge resources and transportation as appropriate   Identify discharge learning needs (meds, wound care, etc)   Refer to discharge planning if patient needs post-hospital services based on physician order or complex needs related to functional status, cognitive ability or social support system     Problem: Pain  Goal: Verbalizes/displays adequate comfort level or baseline comfort level  2/24/2023 1351 by Rm Alexis RN  Outcome: Completed  2/24/2023 0003 by Willie Raygoza  Outcome: Progressing  Flowsheets (Taken 2/24/2023 0003)  Verbalizes/displays adequate comfort level or baseline comfort level:   Encourage patient to monitor pain and request assistance   Administer analgesics based on type and severity of pain and evaluate response   Consider cultural and social influences on pain and pain management   Assess pain using appropriate pain scale   Implement non-pharmacological measures as appropriate and evaluate response     Problem: ABCDS Injury Assessment  Goal: Absence of physical injury  2/24/2023 1351 by Rm Alexis RN  Outcome: Completed  2/24/2023 0003 by Willie Raygoza  Outcome: Progressing  Flowsheets (Taken 2/24/2023 0003)  Absence of Physical Injury: Implement safety measures based on patient assessment     Problem: Nutrition Deficit:  Goal: Optimize nutritional status  2/24/2023 1351 by Rm Alexis RN  Outcome: Completed  Flowsheets (Taken 2/24/2023 1054 by Minerva Ortiz RD, LD)  Nutrient intake appropriate for improving, restoring, or maintaining nutritional needs:   Assess nutritional status and recommend course of action   Monitor oral intake, labs, and treatment plans   Recommend appropriate diets, oral nutritional supplements, and vitamin/mineral supplements  2/24/2023 0003 by Dallin Castellano  Outcome: Progressing  Flowsheets (Taken 2/24/2023 0003)  Nutrient intake appropriate for improving, restoring, or maintaining nutritional needs:   Assess nutritional status and recommend course of action   Monitor oral intake, labs, and treatment plans   Recommend appropriate diets, oral nutritional supplements, and vitamin/mineral supplements   Provide specific nutrition education to patient or family as appropriate     Problem: Safety - Adult  Goal: Free from fall injury  2/24/2023 1351 by Esperanza Sepulveda RN  Outcome: Completed  2/24/2023 0003 by Dallin Castellano  Outcome: Progressing  Flowsheets (Taken 2/24/2023 0003)  Free From Fall Injury:   Instruct family/caregiver on patient safety   Based on caregiver fall risk screen, instruct family/caregiver to ask for assistance with transferring infant if caregiver noted to have fall risk factors   Electronically signed by Sheeba Fan RN on 2/24/2023 at 1:51 PM

## 2023-02-24 NOTE — PROGRESS NOTES
Patient up in chair eating lunch. Patient denies any feelings of nausea or pain. Will continue to monitor.    Electronically signed by Roxi Hansen RN on 2/24/2023 at 11:57 AM

## 2023-02-24 NOTE — PROGRESS NOTES
General and Vascular Surgery                                                           Daily Progress Note                                                                 Pt Name: Apolinar Wolf  Medical Record Number: [id number]  Date of Birth 2000   Today's Date: 2/24/2023    Chief Complaint: abdominal pain    ASSESSMENT/PLAN  POD#4: Choledocholithiasis s/p lap win with gram    -abdominal pain improved today. Incision sites ok  -Some back pain last night after having full liquids. Feeling better today  -ERCP done (2/22/23): Choledocolithiasis s/p biliary sphincterotomy and stone extraction with complete clearance documented by completion occlusion cholangiogram.    -T:bili: 3.6 --> 3.3 -->1.7-->1.5-->1.1. LFTs improving.  -Pancreatitis post ERCP (2/21/23): Lipase improved: >3,000--> 568-->6.7  -Will advance diet to low fat as tolerated  -IVF  -OOB as tolerated, Ambulate. OK to shower  -Monitor and control pain  -Possible D/C home later today if diet tolerated, and pain controlled. SUBJECTIVE  Larisa reports pain better controlled this AM.     OBJECTIVE  VITALS:   Vitals:    02/24/23 0018 02/24/23 0417 02/24/23 0443 02/24/23 0720   BP: 132/82 121/79  124/83   Pulse: 57 54  50   Resp: 16 16  16   Temp: 98.6 °F (37 °C) 98.5 °F (36.9 °C)  98.2 °F (36.8 °C)   TempSrc: Oral Oral  Oral   SpO2: 98% 97%  97%   Weight:   170 lb 3.1 oz (77.2 kg)    Height:         I/O last 3 completed shifts:   In: 26 [P.O.:960; I.V.:3300]  Out: -   GENERAL: alert, no distress  LUNGS: clear to ausculation, without wheezes, rales or rhonci  HEART: normal rate and regular rhythm  ABDOMEN: soft, appropriately tender, wounds clean  EXTREMITY: no cyanosis, clubbing or edema      LABS  CBC:   Lab Results   Component Value Date/Time    WBC 7.9 02/24/2023 08:33 AM    RBC 3.72 02/24/2023 08:33 AM    HGB 10.8 02/24/2023 08:33 AM    HCT 31.0 02/24/2023 08:33 AM    MCV 83.2 02/24/2023 08:33 AM    MCH 29.0 02/24/2023 08:33 AM    MCHC 34.9 02/24/2023 08:33 AM    RDW 13.7 02/24/2023 08:33 AM     02/24/2023 08:33 AM    MPV 9.0 02/24/2023 08:33 AM     CMP:    Lab Results   Component Value Date/Time     02/24/2023 08:34 AM    K 3.6 02/24/2023 08:34 AM    K 4.2 02/20/2023 08:08 AM     02/24/2023 08:34 AM    CO2 26 02/24/2023 08:34 AM    BUN 6 02/24/2023 08:34 AM    CREATININE 0.6 02/24/2023 08:34 AM    AGRATIO 1.4 02/24/2023 08:34 AM    LABGLOM >60 02/24/2023 08:34 AM    GLUCOSE 89 02/24/2023 08:34 AM    PROT 5.8 02/24/2023 08:34 AM    LABALBU 3.4 02/24/2023 08:34 AM    CALCIUM 8.8 02/24/2023 08:34 AM    BILITOT 1.1 02/24/2023 08:34 AM    ALKPHOS 91 02/24/2023 08:34 AM     02/24/2023 08:34 AM     02/24/2023 08:34 AM         Ismael Singh PA-C   Electronically signed 2/24/2023 at 10:02 AM      Surgery Staff  I have examined this patient and read and agree with the note by Barber Hannon PA-C from today. Continues to improve clinically.     Advance to regular diet  Home later today if tolerates PO      Electronically signed by Vincent Pro MD on 2/24/2023 at 11:36 AM

## 2023-02-24 NOTE — PROGRESS NOTES
Patient called requesting PRN nausea medication, see MAR for administration. Suggested that patient only take in ice chips and sips of water. Patient was able to eat a small amount of grits for dinner and since has had increasing pain and nausea.  Patient denies need for pain medication at this time, and is only requesting to rest.

## 2023-03-03 NOTE — DISCHARGE SUMMARY
General Surgery Discharge Summary        Ester Loomis   : 2000 MRN: [id number]  Date of Admission: 2023  Admitting Hunter Berger MD  Primary Care Physician: Mary Ann Balbuena MD  Summary by: Remberto Looney PA-C    Diagnosis Present on Admission:   Choledocholithiasis      Secondary diagnosis:   Patient Active Problem List   Diagnosis    Choledocholithiasis     Procedures: Procedure(s):  ERCP BALLOON SWEEP W/ STONE REMOVAL  ERCP SPHINCTEROTOMY    Summary of hospital stay:   Ester Loomis is a 25 y.o. female who presented with epigastric right upper quadrant pain. CT showed cholelithiasis with dilated intra and extrahepatic bile ducts. Her LFTs slightly improved with a worsening T-bili and discussion was made with GI about availability of ERCP. We discussed about possible lap win with intraoperative cholangiogram to evaluate her bile ducts to forego an MRCP and/or ERCP if the duct to be cleared of stones. Risks of bleeding, infection, anesthesia, risk of injuring surrounding structures requiring an open procedure as well as possible need for ERCP postoperative were discussed with the patient and her mom and they were agreeable to proceed. She was taken to the OR where a laparoscopic cholecystectomy with cholangiogram was carried out. She tolerated the procedure well and was transferred to recovery in stable condition. He cholangiogram was consistent with the presence of choledocholithiasis so an ERCP was arranged for the day after surgery. During the ERCP she had a biliary sphincterotomy, and stone extraction. She tolerated the procedure well. She did howel have post procedure pancreatitis. IVF were continued and she was monitored. The pancreatitis did quickly improve. When her pancreatitis improved her diet was advanced and tolerated. Her pain was monitored and controlled, and she was ambulating well. She was discharged home in stable condition.    Discharge Medications:  Discharge Medication List as of 2/24/2023  1:55 PM        START taking these medications    Details   oxyCODONE (ROXICODONE) 5 MG immediate release tablet Take 1 tablet by mouth every 6 hours as needed for Pain for up to 7 days. Max Daily Amount: 20 mg, Disp-15 tablet, R-0Normal      ondansetron (ZOFRAN-ODT) 4 MG disintegrating tablet Take 1 tablet by mouth every 8 hours as needed for Nausea or Vomiting, Disp-15 tablet, R-0Normal             Discharged to:  home    Instruction:  The patient is instructed to return to the hospital or call the office for fevers > 101.5F, inability to tolerate a diet, or unexpected pain. Surgery specific discharge instruction sheet was provided to the patient.   Diet: regular diet   Activity: activity as tolerated    Follow up:  Dr. Jo Every in 1-2 weeks    Electronically signed by Dea Patel PA-C on 3/3/2023 at 2:25 PM

## 2023-03-08 ENCOUNTER — OFFICE VISIT (OUTPATIENT)
Dept: SURGERY | Age: 23
End: 2023-03-08

## 2023-03-08 VITALS — BODY MASS INDEX: 24.39 KG/M2 | WEIGHT: 170 LBS

## 2023-03-08 DIAGNOSIS — K80.50 CHOLEDOCHOLITHIASIS: Primary | ICD-10-CM

## 2023-03-08 PROCEDURE — 99024 POSTOP FOLLOW-UP VISIT: CPT | Performed by: SURGERY

## 2023-03-08 NOTE — PROGRESS NOTES
Subjective:      Patient ID: Lazarus Guest is a 25 y.o. female. HPI  S/p lap win with gram, ERCP 2 weeks ago. Doing great. Pain resolved. Tolerating regular PO. Denies loose stools. No apparent complications    Path  Gallbladder, cholecystectomy:      - Chronic cholecystitis with cholelithiasis      - One benign/reactive appearing lymph node   Review of Systems    Objective:   Physical Exam  Wounds healed  Abdomen nontender  Assessment:       Diagnosis Orders   1.  Choledocholithiasis                Plan:      Recovering well  Path discussed  No diet or activity restrictions  Return PRN        Nancy Pina MD

## (undated) DEVICE — RETRIEVAL BALLOON CATHETER: Brand: EXTRACTOR™ PRO RX

## (undated) DEVICE — SINGLE USE DISTAL COVER MAJ-2315: Brand: SINGLE USE DISTAL COVER

## (undated) DEVICE — INSUFFLATION NEEDLE TO ESTABLISH PNEUMOPERITONEUM.: Brand: INSUFFLATION NEEDLE

## (undated) DEVICE — BITE BLOCK ENDOSCP AD 60 FR W/ ADJ STRP PLAS GRN BLOX

## (undated) DEVICE — GOWN SIRUS NONREIN LG W/TWL: Brand: MEDLINE INDUSTRIES, INC.

## (undated) DEVICE — SYRINGE 20ML LL S/C 50

## (undated) DEVICE — SOLUTION IV IRRIG POUR BRL 0.9% SODIUM CHL 2F7124

## (undated) DEVICE — GLOVE ORANGE PI 7 1/2   MSG9075

## (undated) DEVICE — 4F 40 CM NOVASIL SILICONE SINGLE LUMEN EMBOLECTOMY CATHETER, EIFU: Brand: LEMAITRE EMBOLECTOMY CATHETER

## (undated) DEVICE — TISSUE RETRIEVAL SYSTEM: Brand: INZII RETRIEVAL SYSTEM

## (undated) DEVICE — GLOVE ORANGE PI 7   MSG9070

## (undated) DEVICE — ADHESIVE SKIN CLSR 0.7ML TOP DERMBND ADV

## (undated) DEVICE — PMI DISPOSABLE PUNCTURE CLOSURE DEVICE / SUTURE GRASPER: Brand: PMI

## (undated) DEVICE — COVER LT HNDL BLU PLAS

## (undated) DEVICE — INDICATED FOR USE DURING OPEN AND LAPAROSCOPIC CHOLECYSTECTOMY PROCEDURES TO INJECT RADIOPAQUE MEDIA THROUGH THE CYSTIC DUCT INTO THE BILIARY TREE.: Brand: AEROSTAT®

## (undated) DEVICE — ENDOSCOPY KIT: Brand: MEDLINE INDUSTRIES, INC.

## (undated) DEVICE — SET INSUF TUBE HEAT ISO CONN DISP

## (undated) DEVICE — APPLIER CLP M L L11.4IN DIA10MM ENDOSCP ROT MULT FOR LIG

## (undated) DEVICE — DEVICE LOK BILI BX CAP RAP EXCHG DISPOSABLE

## (undated) DEVICE — ADTEC SINGLE USE HOOK SCISSORS, SHAFT ONLY, MONOPOLAR, STRAIGHT, WORKING LENGTH: 12 1/4", (310 MM), DIAM. 5 MM, BLUNT/BLUNT, INSULATED, SINGLE ACTION, STERILE, DISPOSABLE, PACKAGE OF 10 PIECES: Brand: AESCULAP

## (undated) DEVICE — GENERAL LAPAROSCOPY: Brand: MEDLINE INDUSTRIES, INC.

## (undated) DEVICE — ELECTRODE PT RET AD L9FT HI MOIST COND ADH HYDRGEL CORDED

## (undated) DEVICE — SUTURE SZ 0 27IN 5/8 CIR UR-6  TAPER PT VIOLET ABSRB VICRYL J603H

## (undated) DEVICE — TROCAR: Brand: KII SLEEVE

## (undated) DEVICE — SUTURE VCRL + SZ 4-0 L18IN ABSRB UD L19MM PS-2 3/8 CIR PRIM VCP496H

## (undated) DEVICE — GARMENT COMPR STD FOR 17IN CALF UNIF THER FLOTRN

## (undated) DEVICE — Device

## (undated) DEVICE — ORGANIZER MED DEV W76XL86CM PCH W25XL28CM FOR ENDOSCP TBL

## (undated) DEVICE — TROCAR: Brand: KII SHIELDED BLADED ACCESS SYSTEM

## (undated) DEVICE — SPHINCTEROTOME: Brand: JAGTOME RX 39

## (undated) DEVICE — LOOP LIG SUT SZ 0 L18IN ABSRB POLYDIOXANONE MFIL PDS II

## (undated) DEVICE — GOWN SIRUS NONREIN XL W/TWL: Brand: MEDLINE INDUSTRIES, INC.

## (undated) DEVICE — C-ARM: Brand: UNBRANDED

## (undated) DEVICE — GUIDEWIRE VASC L260CM TIP L5CM 0.25FR STR RND TIP TUNGSTEN

## (undated) DEVICE — SYRINGE MED 30ML STD CLR PLAS LUERLOCK TIP N CTRL DISP